# Patient Record
Sex: MALE | Race: WHITE | Employment: OTHER | URBAN - METROPOLITAN AREA
[De-identification: names, ages, dates, MRNs, and addresses within clinical notes are randomized per-mention and may not be internally consistent; named-entity substitution may affect disease eponyms.]

---

## 2019-01-15 ENCOUNTER — HOSPITAL ENCOUNTER (OUTPATIENT)
Dept: WOUND CARE | Age: 84
Discharge: HOME | End: 2019-01-15
Payer: MEDICARE

## 2019-01-15 VITALS
RESPIRATION RATE: 18 BRPM | SYSTOLIC BLOOD PRESSURE: 166 MMHG | WEIGHT: 200 LBS | BODY MASS INDEX: 32.14 KG/M2 | HEART RATE: 81 BPM | DIASTOLIC BLOOD PRESSURE: 88 MMHG | HEIGHT: 66 IN | OXYGEN SATURATION: 97 % | TEMPERATURE: 98 F

## 2019-01-15 PROBLEM — M70.22 OLECRANON BURSITIS, LEFT ELBOW: Status: ACTIVE | Noted: 2019-01-15

## 2019-01-15 PROCEDURE — 99214 OFFICE O/P EST MOD 30 MIN: CPT

## 2019-01-15 NOTE — WOUND CARE
01/15/19 1420 Wound Elbow Left Date First Assessed/Time First Assessed: 01/15/19 1418   POA: Yes  Wound Type: Abcess; Other  Location: Elbow  Wound Description (Optional): open with drainage  Orientation: Left DRESSING STATUS Intact; Old drainage; Other (comment) (bandaide) DRESSING TYPE (bandaide) Pressure Injury Stage 3 Wound Length (cm) 0.2 cm Wound Width (cm) 0.3 cm Wound Depth (cm) 1 Wound Surface area (cm^2) 0.06 cm^2 Condition of Find That File Condition of Edges Rolled/curled Tissue Type Yellow Tissue Type Percent Pink (white) Tissue Type Percent Yellow 90 Direction of Tunnels 12  oclock Depth of Tunnel/Sinus (cm) 3.4 cm Drainage Color Clear Periwound Skin Condition Erythema, blanchable Cleansing and Cleansing Agents  Dermal wound cleanser Dressing Type Applied Collagens/cell matrix (mepitel one, mepilex heel border, coban ) Procedure Tolerated Well

## 2019-01-15 NOTE — WOUND CARE
[facility] Consult [facility] Chief Complaint: 
Kevin Emery is a 80 y.o.  male who presents with left elbow open wound and drainage secondary to olecranon bursitis since December 2018. Referred by:  Arturo Rogers HPI:   He started with swelling and later it was inflamed, he was seen by his PCP and diagnosed with olecranon bursitis, he had cultures done and started on augmentin. Wound caused by: likely pressure Current wound care: local wound care Offloading wound: no Appetite: good Wound associated pain: mild Diabetic: no 
Smoker: no 
 
 
Past Medical History:  
Diagnosis Date  CAD (coronary artery disease)  Hypertension  Macular degeneration Past Surgical History:  
Procedure Laterality Date [address] [facility] CABG  
 HX HEENT  2007  
 surgical lens implant History reviewed. No pertinent family history. Social History Tobacco Use  Smoking status: Never Smoker Substance Use Topics  Alcohol use: No  
   
Prior to Admission medications Medication Sig Start Date End Date Taking? Authorizing Provider  
clopidogrel (PLAVIX) 75 mg tablet Take  by mouth daily. Yes Amy, MD Felipe  
aspirin 81 mg chewable tablet Take 81 mg by mouth daily. Yes Other, MD Felipe  
lisinopril (PRINIVIL, ZESTRIL) 20 mg tablet Take  by mouth daily. Yes Amy, MD Felipe  
simvastatin (ZOCOR) 20 mg tablet Take  by mouth nightly. Yes Felipe Reyes MD  
furosemide (LASIX) 20 mg tablet Take 20 mg by mouth two (2) days a week. Indications: sundays and wednesdays   Yes Amy, MD Felipe  
nitroglycerin (NITROSTAT) 0.4 mg SL tablet 0.4 mg by SubLINGual route every five (5) minutes as needed for Chest Pain. Yes Amy, MD Felipe  
ergocalciferol (VITAMIN D2) 50,000 unit capsule Take 50,000 Units by mouth daily. Yes Amy, MD Felipe  
fluticasone (FLONASE) 50 mcg/actuation nasal spray 2 Sprays by Both Nostrils route daily.    Yes Felipe Reyes MD  
 loratadine (CLARITIN) 10 mg tablet Take 10 mg by mouth daily. Yes Other, MD Felipe  
ranitidine (ZANTAC) 150 mg tablet Take 150 mg by mouth two (2) times a day. Yes Other, MD Felipe  
multivitamin, tx-iron-ca-min (THERA-M W/ IRON) 9 mg iron-400 mcg tab tablet Take 1 Tab by mouth daily. Yes Other, MD Felipe  
carboxymethylcellulose sodium (REFRESH LIQUIGEL) 1 % dlgl Apply  to eye. Yes Other, MD Felipe  
 
Allergies Allergen Reactions  Iodinated Contrast- Oral And Iv Dye Anaphylaxis  Iodine Anaphylaxis  Cleocin [Clindamycin Hcl] Rash  Sulfa (Sulfonamide Antibiotics) Rash Review of Systems Gen: No fever, chills, malaise, weight loss/gain. Heent: No headache, rhinorrhea, epistaxis, ear pain, hearing loss, sinus pain, neck pain/stiffness, sore throat. Heart: No chest pain, palpitations, PATRICIO, pnd, or orthopnea. Resp: No cough, hemoptysis, wheezing and shortness of breath. GI: No nausea, vomiting, diarrhea, constipation, melena or hematochezia. : No urinary obstruction, dysuria or hematuria. Derm: see above Musc/skeletal: no bone or joint complains. Vasc: No edema, cyanosis or claudication. Endo: No heat/cold intolerance, no polyuria,polydipsia or polyphagia. Neuro: No unilateral weakness, numbness, tingling. No seizures. Heme: No easy bruising or bleeding. Objective:  
 
Physical Exam:  
 
Visit Vitals /88 (BP 1 Location: Left arm, BP Patient Position: Sitting) Pulse 81 Temp 98 °F (36.7 °C) Resp 18 Ht 5' 6\" (1.676 m) Wt 90.7 kg (200 lb) SpO2 97% BMI 32.28 kg/m² General: well developed, well nourished, pleasant , NAD. Hygiene good Psych: cooperative. Pleasant. No anxiety or depression. Normal mood and affect. Neuro: alert and oriented to person/place/situation. Otherwise nonfocal. 
Derm: Skin turgor for age, dry skin HEENT: Normocephalic, atraumatic. EOMI. Conjunctiva clear. No scleral icterus. Neck: Normal range of motion. No masses. Chest: Good air entry bilaterally. Respirations nonlabored Cardio[de-identified] Normal heart sounds,no rubs, murmurs or gallops Abdomen: Soft, nontender, nondistended, normoactive bowel sounds Lower extremities: color normal; temperature normal. Calves are supple, nontender. Capillary refill <3 sec Open wound left elbow Wound Description: Wound Length: 0.2 cm Wound Width :0.3 cm Wound Depth :1 
 
Etiology: pressure Ulcer bed: wound bed not visible Periwound: Edematous Exudate: Scant/small amount Serous exudate Data Review: No results found for this or any previous visit (from the past 24 hour(s)). Assessment:  
 
Patient Active Problem List  
Diagnosis Code  Olecranon bursitis, left elbow M70.22  
 
80 y.o. male with olecranon bursitis Needs : 
 
Good local wound care Keep pressure off Plan: In wound care clinic today: 
Cleanse wound with NS or soap and water or commercial wound cleanser Apply the following topically to wound bed: evelin Apply the following to prince-wound: NA Apply the following dressings: Absorptive dressing For Home Care/Self Care: keep pressure off left elbow Cleanse wound with NS or soap and water or commercial wound cleanser Keep dressing dry and intact when bathing Apply the following to wound bed: evelin Apply the following to skin around wound: NA Apply the following dressings: Absorptive dressing Patient to return for wound care in: 14  Days Follow up with Nurse visit as recommended. PLEASE CONTACT OFFICE AS SOON AS POSSIBLE IF UNABLE TO MAKE THIS APPOINTMENT. Inspect your wounds, looking for signs of infection which may include the following:  Increase in redness  Red \"streaks\" from wound  Increase in swelling  Fever  Unusual odor  Change in the amount of wound drainage.  Should you experience any significant changes in your wound(s) or have any questions regarding your home care instructions please contact the wound center or your home health company. If after regular business hours, please call your family doctor or local emergency room. Signed By: Alejandro Koenig MD   
 January 15, 2019

## 2019-01-21 ENCOUNTER — HOSPITAL ENCOUNTER (OUTPATIENT)
Dept: WOUND CARE | Age: 84
Discharge: HOME | End: 2019-01-21
Payer: MEDICARE

## 2019-01-21 VITALS
SYSTOLIC BLOOD PRESSURE: 120 MMHG | DIASTOLIC BLOOD PRESSURE: 53 MMHG | TEMPERATURE: 97.5 F | RESPIRATION RATE: 17 BRPM | OXYGEN SATURATION: 95 % | HEART RATE: 84 BPM

## 2019-01-21 PROCEDURE — 99211 OFF/OP EST MAY X REQ PHY/QHP: CPT

## 2019-01-21 NOTE — WOUND CARE
01/21/19 1046 Wound Elbow Left Date First Assessed/Time First Assessed: 01/15/19 1418   POA: Yes  Wound Type: Abcess; Other  Location: Elbow  Wound Description (Optional): open with drainage  Orientation: Left DRESSING STATUS Clean, dry, and intact DRESSING TYPE Absorptive Non-Pressure Injury Full thickness (subcut/muscle) Wound Length (cm) 0.4 cm Wound Width (cm) 0.5 cm Wound Depth (cm) 0.2 Wound Surface area (cm^2) 0.2 cm^2 Change in Wound Size % -233.33 Condition of Base Pink Condition of Edges Open Tissue Type Pink;Red Direction of Tunnels 11  oclock;1    oclock Depth of Tunnel/Sinus (cm) 3.1 cm Drainage Amount  Moderate Drainage Color Serosanguinous Wound Odor None Periwound Skin Condition Intact Cleansing and Cleansing Agents  Other (comment); Soap and water (vashe) Dressing Type Applied Other (Comment) (Patti packing, bacitracin, mepilex heel, soft roll, ACE) Procedure Tolerated Well

## 2019-01-21 NOTE — DISCHARGE INSTRUCTIONS
For Home Care/Self Care  Keep dressing dry and intact when bathing    Leave dressings in place until next visit    Patient to return for wound care in: [place] [address] IF UNABLE TO 24 [facility]. Inspect your wounds, looking for signs of infection which may include the following:  Increase in redness  Red \"streaks\" from wound  Increase in swelling  Fever  Unusual odor  Change in the amount of wound drainage. Should you experience any significant changes in your wound(s) or have any questions regarding your home care instructions please contact the wound center or your home health company. If after regular business hours, please call your family doctor or local emergency room. Edema Control:   Elevate legs as much as possible. Avoid standing in one position for more than 10 minutes. Avoid setting with legs down. Do not cross legs while sitting. Off-Loading:     Frequent position changes. Do not cross legs while sitting. Shift weight every 20 minutes or more when sitting for prolonged periods of time.

## 2019-01-24 ENCOUNTER — HOSPITAL ENCOUNTER (OUTPATIENT)
Dept: WOUND CARE | Age: 84
Discharge: HOME | End: 2019-01-24
Payer: MEDICARE

## 2019-01-24 VITALS
DIASTOLIC BLOOD PRESSURE: 59 MMHG | RESPIRATION RATE: 16 BRPM | SYSTOLIC BLOOD PRESSURE: 149 MMHG | HEART RATE: 74 BPM | OXYGEN SATURATION: 99 % | TEMPERATURE: 98.3 F

## 2019-01-24 PROCEDURE — 99211 OFF/OP EST MAY X REQ PHY/QHP: CPT

## 2019-01-24 NOTE — WOUND CARE
01/24/19 1107 Wound Elbow Left Date First Assessed/Time First Assessed: 01/15/19 1418   POA: Yes  Wound Type: Abcess; Other  Location: Elbow  Wound Description (Optional): open with drainage  Orientation: Left DRESSING STATUS Clean, dry, and intact DRESSING TYPE Absorptive Non-Pressure Injury Full thickness (subcut/muscle) Wound Length (cm) 0.3 cm Wound Width (cm) 0.3 cm Wound Depth (cm) 0.2 Wound Surface area (cm^2) 0.09 cm^2 Change in Wound Size % -50 Condition of Base Pink Condition of Edges Open Tissue Type Pink;Red Direction of Tunnels 11  oclock;12  oclock Depth of Tunnel/Sinus (cm) 4.5 cm Drainage Amount  Moderate Drainage Color Serosanguinous; Tan  
Wound Odor None Periwound Skin Condition Intact Cleansing and Cleansing Agents  (vashe) Dressing Type Applied (evelin packing,calcium alginate,mepilex heel border,softroll) Procedure Tolerated Well

## 2019-01-28 ENCOUNTER — HOSPITAL ENCOUNTER (OUTPATIENT)
Dept: WOUND CARE | Age: 84
Discharge: HOME | End: 2019-01-28
Payer: MEDICARE

## 2019-01-28 VITALS
DIASTOLIC BLOOD PRESSURE: 59 MMHG | SYSTOLIC BLOOD PRESSURE: 149 MMHG | HEART RATE: 74 BPM | OXYGEN SATURATION: 99 % | TEMPERATURE: 98.3 F

## 2019-01-28 PROCEDURE — 99211 OFF/OP EST MAY X REQ PHY/QHP: CPT

## 2019-01-28 NOTE — WOUND CARE
01/28/19 1403 Wound Elbow Left Date First Assessed/Time First Assessed: 01/15/19 1418   POA: Yes  Wound Type: Abcess; Other  Location: Elbow  Wound Description (Optional): open with drainage  Orientation: Left DRESSING STATUS Clean, dry, and intact Non-Pressure Injury Full thickness (subcut/muscle) Wound Length (cm) 0.3 cm Wound Width (cm) 0.3 cm Wound Depth (cm) 0 Wound Surface area (cm^2) 0.09 cm^2 Change in Wound Size % -50 Condition of Base Pink Condition of Edges Open Tissue Type Pink;Red Direction of Tunnels 11  oclock;12  oclock Depth of Tunnel/Sinus (cm) 3.5 cm Drainage Amount  Moderate Drainage Color Serosanguinous; Tan  
Wound Odor None Periwound Skin Condition Intact Cleansing and Cleansing Agents  Other (comment) (Vashe wound cleanser) Dressing Type Applied Collagens/cell matrix 
(soft roll, coban, mepitel one, mepilex, border) Procedure Tolerated Well

## 2019-01-31 ENCOUNTER — HOSPITAL ENCOUNTER (OUTPATIENT)
Dept: WOUND CARE | Age: 84
Discharge: HOME | End: 2019-01-31
Payer: MEDICARE

## 2019-01-31 VITALS
TEMPERATURE: 98.6 F | SYSTOLIC BLOOD PRESSURE: 117 MMHG | OXYGEN SATURATION: 99 % | RESPIRATION RATE: 18 BRPM | DIASTOLIC BLOOD PRESSURE: 50 MMHG | HEART RATE: 75 BPM

## 2019-01-31 PROCEDURE — 99211 OFF/OP EST MAY X REQ PHY/QHP: CPT

## 2019-01-31 NOTE — DISCHARGE INSTRUCTIONS
er   Keep dressing dry and intact when bathing   Apply the following to wound bed: evelin   Apply the following to skin around wound: NA   Apply the following dressings: Absorptive dressing     Leave dressings in place until next visit     Patient to return for wound care in:   Days   Follow up with Nurse visit as recommended. PLEASE CONTACT OFFICE AS SOON AS POSSIBLE IF UNABLE TO MAKE THIS APPOINTMENT. Inspect your wounds, looking for signs of infection which may include the following:  Increase in redness  Red \"streaks\" from wound  Increase in swelling  Fever  Unusual odor  Change in the amount of wound drainage. Should you experience any significant changes in your wound(s) or have any questions regarding your home care instructions please contact the wound center or your home health company. If after regular business hours, please call your family doctor or local emergency room.     01/28/19 [id number]

## 2019-02-04 ENCOUNTER — APPOINTMENT (OUTPATIENT)
Dept: WOUND CARE | Age: 84
End: 2019-02-04

## 2019-02-04 ENCOUNTER — HOSPITAL ENCOUNTER (OUTPATIENT)
Dept: WOUND CARE | Age: 84
Discharge: HOME | End: 2019-02-04
Attending: HOSPITALIST
Payer: MEDICARE

## 2019-02-04 VITALS
TEMPERATURE: 97.8 F | DIASTOLIC BLOOD PRESSURE: 58 MMHG | SYSTOLIC BLOOD PRESSURE: 139 MMHG | OXYGEN SATURATION: 96 % | HEART RATE: 72 BPM | RESPIRATION RATE: 20 BRPM

## 2019-02-04 PROCEDURE — 99211 OFF/OP EST MAY X REQ PHY/QHP: CPT

## 2019-02-04 NOTE — DISCHARGE INSTRUCTIONS
For Home Care/Self Care  Keep dressing dry and intact when bathing    Leave dressings in place until next visit    Patient to return for wound care in: [place] [address] IF UNABLE TO 24 [facility]. Inspect your wounds, looking for signs of infection which may include the following:  Increase in redness  Red \"streaks\" from wound  Increase in swelling  Fever  Unusual odor  Change in the amount of wound drainage. Should you experience any significant changes in your wound(s) or have any questions regarding your home care instructions please contact the wound center or your home health company. If after regular business hours, please call your family doctor or local emergency room. Edema Control:   Elevate legs as much as possible. Avoid standing in one position for more than 10 minutes. Avoid setting with legs down. Do not cross legs while sitting. Off-Loading:     Frequent position changes. Do not cross legs while sitting. Shift weight every 20 minutes or more when sitting for prolonged periods of time.

## 2019-02-04 NOTE — WOUND CARE
02/04/19 1455 Wound Elbow Left Date First Assessed/Time First Assessed: 01/15/19 1418   POA: Yes  Wound Type: Abcess; Other  Location: Elbow  Wound Description (Optional): open with drainage  Orientation: Left Dressing Status  Clean, dry, and intact Dressing Type  Absorptive Non-Pressure Injury Full thickness (subcut/muscle) Wound Length (cm) 0.2 cm Wound Width (cm) 0.2 cm Wound Depth (cm) 0.3 Wound Surface area (cm^2) 0.04 cm^2 Change in Wound Size % 33.33 Condition of Base Pink Condition of Edges Open Tissue Type Pink;Red Direction of Undermining 7    oclock;4    oclock Depth of Undermining (cm) (5 cm at 12 o'clock) Drainage Amount  Moderate Drainage Color Serosanguinous Wound Odor None Periwound Skin Condition Intact Cleansing and Cleansing Agents  Other (comment); Soap and water (vashe) Dressing Type Applied Other (Comment) (Patti, bacitracin, Mepilex border, soft roll, coban) Procedure Tolerated Well

## 2019-02-05 NOTE — WOUND CARE
01/31/19 1058 Wound Elbow Left Date First Assessed/Time First Assessed: 01/15/19 1418   POA: Yes  Wound Type: Abcess; Other  Location: Elbow  Wound Description (Optional): open with drainage  Orientation: Left Dressing Status  Intact; Old drainage Dressing Type  Absorptive Non-Pressure Injury Full thickness (subcut/muscle) Wound Length (cm) 0.3 cm Wound Width (cm) 0.3 cm Wound Depth (cm) 0.1 Wound Surface area (cm^2) 0.09 cm^2 Change in Wound Size % -50 Condition of Base Pink Condition of Edges Open Tissue Type Pink;Red Direction of Tunnels 11  oclock;12  oclock Depth of Tunnel/Sinus (cm) (3.5) Drainage Amount  Moderate Drainage Color Serosanguinous; Tan  
Wound Odor None Periwound Skin Condition Intact Cleansing and Cleansing Agents  Dermal wound cleanser; Other (comment) Dressing Type Applied Collagens/cell matrix (Aquacel Ag, bacitracin, mepilex foam, soft roll, coban) Procedure Tolerated Well

## 2019-02-07 ENCOUNTER — HOSPITAL ENCOUNTER (OUTPATIENT)
Dept: WOUND CARE | Age: 84
Discharge: HOME | End: 2019-02-07
Attending: HOSPITALIST
Payer: MEDICARE

## 2019-02-07 VITALS
OXYGEN SATURATION: 100 % | TEMPERATURE: 97.9 F | SYSTOLIC BLOOD PRESSURE: 155 MMHG | HEART RATE: 67 BPM | RESPIRATION RATE: 18 BRPM | DIASTOLIC BLOOD PRESSURE: 54 MMHG

## 2019-02-07 PROCEDURE — 99211 OFF/OP EST MAY X REQ PHY/QHP: CPT

## 2019-02-07 NOTE — WOUND CARE
02/07/19 1027 Wound Elbow Left Date First Assessed/Time First Assessed: 01/15/19 1418   POA: Yes  Wound Type: Abcess; Other  Location: Elbow  Wound Description (Optional): open with drainage  Orientation: Left Dressing Status  Clean, dry, and intact Dressing Type  Absorptive Non-Pressure Injury Full thickness (subcut/muscle) Wound Length (cm) 0.2 cm Wound Width (cm) 0.2 cm Wound Depth (cm) 0.3 Wound Surface area (cm^2) 0.04 cm^2 Change in Wound Size % 33.33 Condition of Base Pink Condition of Edges Open Tissue Type Pink;Red Direction of Tunnels 11  oclock;12  oclock Drainage Amount  Moderate Drainage Color Serosanguinous Wound Odor None Periwound Skin Condition Intact Cleansing and Cleansing Agents  Other (comment) Dressing Type Applied Other (Comment) 
(evelin,bacitracin,mepitel one border heel,softroll,coban.) Procedure Tolerated Well

## 2019-02-11 ENCOUNTER — HOSPITAL ENCOUNTER (OUTPATIENT)
Dept: WOUND CARE | Age: 84
Discharge: HOME | End: 2019-02-11
Attending: HOSPITALIST
Payer: MEDICARE

## 2019-02-11 VITALS
OXYGEN SATURATION: 98 % | HEART RATE: 67 BPM | DIASTOLIC BLOOD PRESSURE: 56 MMHG | TEMPERATURE: 98.2 F | RESPIRATION RATE: 17 BRPM | SYSTOLIC BLOOD PRESSURE: 146 MMHG

## 2019-02-11 PROCEDURE — 99211 OFF/OP EST MAY X REQ PHY/QHP: CPT

## 2019-02-11 NOTE — DISCHARGE INSTRUCTIONS
In wound care clinic today:[id number] wound with NS or soap and water or commercial wound cleanser  Apply the following topically to wound bed: evelin  Apply the following to prince-wound: NA  Apply the following dressings: Absorptive dressing    For Home Care/Self Care: keep pressure off left elbow  Keep dressing dry and intact when bathing    Leave dressings in place until next visit    Patient to return for wound care in: 2/14/19 for nurse evaluation      PLEASE CONTACT OFFICE AS SOON AS POSSIBLE IF UNABLE TO [address] [facility] [address]. Inspect your wounds, looking for signs of infection which may include the following:  Increase in redness  Red \"streaks\" from wound  Increase in swelling  Fever  Unusual odor  Change in the amount of wound drainage. Should you experience any significant changes in your wound(s) or have any questions regarding your home care instructions please contact the wound center or your home health company.  If after regular business hours, please call your family doctor or local emergency room

## 2019-02-11 NOTE — WOUND CARE
02/11/19 1010 Wound Elbow Left Date First Assessed/Time First Assessed: 01/15/19 1418   POA: Yes  Wound Type: Abcess; Other  Location: Elbow  Wound Description (Optional): open with drainage  Orientation: Left Dressing Status  Clean, dry, and intact Dressing Type  Absorptive Non-Pressure Injury Full thickness (subcut/muscle) Wound Length (cm) 0.2 cm Wound Width (cm) 0.2 cm Wound Depth (cm) 0.3 Wound Surface area (cm^2) 0.04 cm^2 Change in Wound Size % 33.33 Condition of Base Pink Condition of Edges Rolled/curled Tissue Type Pink Depth of Undermining (cm) (did not investigate risk damaging connective membranes) Drainage Amount  Moderate Drainage Color Serous Wound Odor None Periwound Skin Condition Ecchymosis; Erythema, blanchable Cleansing and Cleansing Agents  Dermal wound cleanser Dressing Type Applied Collagens/cell matrix; Silicone Procedure Tolerated Well

## 2019-02-14 ENCOUNTER — HOSPITAL ENCOUNTER (OUTPATIENT)
Dept: WOUND CARE | Age: 84
Discharge: HOME | End: 2019-02-14
Attending: HOSPITALIST
Payer: MEDICARE

## 2019-02-14 VITALS
SYSTOLIC BLOOD PRESSURE: 171 MMHG | HEART RATE: 63 BPM | OXYGEN SATURATION: 100 % | DIASTOLIC BLOOD PRESSURE: 56 MMHG | TEMPERATURE: 98.6 F

## 2019-02-14 PROCEDURE — 99211 OFF/OP EST MAY X REQ PHY/QHP: CPT

## 2019-02-14 NOTE — WOUND CARE
02/14/19 1152 Wound Elbow Left Date First Assessed/Time First Assessed: 01/15/19 1418   POA: Yes  Wound Type: Abcess; Other  Location: Elbow  Wound Description (Optional): open with drainage  Orientation: Left Dressing Status  Clean, dry, and intact Dressing Type  Absorptive Non-Pressure Injury Full thickness (subcut/muscle) Wound Length (cm) 0.2 cm Wound Width (cm) 0.2 cm Wound Depth (cm) 3.5 Wound Surface area (cm^2) 0.04 cm^2 Change in Wound Size % 33.33 Condition of Base Pink Condition of Edges Rolled/curled Tissue Type Pink Direction of Tunnels 11  oclock;12  oclock Drainage Amount  Scant Drainage Color Clear Wound Odor None Periwound Skin Condition Erythema, blanchable Cleansing and Cleansing Agents  Dermal wound cleanser Dressing Type Applied Collagens/cell matrix Procedure Tolerated Well

## 2019-02-18 ENCOUNTER — HOSPITAL ENCOUNTER (OUTPATIENT)
Dept: WOUND CARE | Age: 84
Discharge: HOME | End: 2019-02-18
Attending: HOSPITALIST
Payer: MEDICARE

## 2019-02-18 VITALS
OXYGEN SATURATION: 99 % | RESPIRATION RATE: 18 BRPM | TEMPERATURE: 98.3 F | DIASTOLIC BLOOD PRESSURE: 65 MMHG | SYSTOLIC BLOOD PRESSURE: 141 MMHG

## 2019-02-18 PROCEDURE — 99211 OFF/OP EST MAY X REQ PHY/QHP: CPT

## 2019-02-18 NOTE — WOUND CARE
02/18/19 1024 Wound Elbow Left Date First Assessed/Time First Assessed: 01/15/19 1418   POA: Yes  Wound Type: Abcess; Other  Location: Elbow  Wound Description (Optional): open with drainage  Orientation: Left Dressing Status  Clean, dry, and intact Dressing Type  Absorptive Non-Pressure Injury Full thickness (subcut/muscle) Wound Length (cm) 0.2 cm Wound Width (cm) 0.2 cm Wound Depth (cm) 3.5 Wound Surface area (cm^2) 0.04 cm^2 Change in Wound Size % 33.33 Condition of Base Pink Condition of Edges Rolled/curled; Open Tissue Type Pink Direction of Tunnels 11  oclock;12  oclock Drainage Amount  Scant Drainage Color Serous Wound Odor None Periwound Skin Condition Erythema, blanchable Cleansing and Cleansing Agents  Dermal wound cleanser Dressing Type Applied (evelin,mepilex border,softroll,coban)

## 2019-02-21 ENCOUNTER — HOSPITAL ENCOUNTER (OUTPATIENT)
Dept: WOUND CARE | Age: 84
Discharge: HOME | End: 2019-02-21
Attending: HOSPITALIST
Payer: MEDICARE

## 2019-02-21 VITALS
OXYGEN SATURATION: 96 % | RESPIRATION RATE: 18 BRPM | HEART RATE: 70 BPM | DIASTOLIC BLOOD PRESSURE: 56 MMHG | SYSTOLIC BLOOD PRESSURE: 115 MMHG | TEMPERATURE: 97.6 F

## 2019-02-21 PROCEDURE — 99211 OFF/OP EST MAY X REQ PHY/QHP: CPT

## 2019-02-21 NOTE — WOUND CARE
02/21/19 1040 Wound Elbow Left Date First Assessed/Time First Assessed: 01/15/19 1418   POA: Yes  Wound Type: Abcess; Other  Location: Elbow  Wound Description (Optional): open with drainage  Orientation: Left Dressing Status  Reinforced Dressing Type  Absorptive Non-Pressure Injury Full thickness (subcut/muscle) Wound Length (cm) 0.2 cm Wound Width (cm) 0.2 cm Wound Depth (cm) 3.2 Wound Surface area (cm^2) 0.04 cm^2 Change in Wound Size % 33.33 Condition of Base Pink Condition of Edges Open Tissue Type Pink Direction of Undermining 11    oclock;12    oclock Depth of Undermining (cm) 3.2 Drainage Amount  Moderate Drainage Color Serosanguinous; Tan  
Wound Odor None Periwound Skin Condition Intact Cleansing and Cleansing Agents  Other (comment) (vashe) Dressing Type Applied Other (Comment) (Patti, Mepilex border, soft roll, coban) Procedure Tolerated Well

## 2019-02-21 NOTE — DISCHARGE INSTRUCTIONS
For Home Care/Self Care  Keep dressing dry and intact when bathing    Leave dressings in place until next visit    [facility]. Inspect your wounds, looking for signs of infection which may include the following:  Increase in redness  Red \"streaks\" from wound  Increase in swelling  Fever  Unusual odor  Change in the amount of wound drainage. Should you experience any significant changes in your wound(s) or have any questions regarding your home care instructions please contact the wound center or your home health company. If after regular business hours, please call your family doctor or local emergency room. Edema Control:   Elevate legs as much as possible. Avoid standing in one position for more than 10 minutes. Avoid setting with legs down. Do not cross legs while sitting. Off-Loading:     Frequent position changes. Do not cross legs while sitting. Shift weight every 20 minutes or more when sitting for prolonged periods of time.

## 2019-02-25 ENCOUNTER — HOSPITAL ENCOUNTER (OUTPATIENT)
Dept: WOUND CARE | Age: 84
Discharge: HOME | End: 2019-02-25
Attending: HOSPITALIST
Payer: MEDICARE

## 2019-02-25 VITALS
SYSTOLIC BLOOD PRESSURE: 161 MMHG | RESPIRATION RATE: 18 BRPM | DIASTOLIC BLOOD PRESSURE: 62 MMHG | OXYGEN SATURATION: 100 % | TEMPERATURE: 97.6 F | HEART RATE: 72 BPM

## 2019-02-25 PROCEDURE — 99211 OFF/OP EST MAY X REQ PHY/QHP: CPT

## 2019-02-25 NOTE — DISCHARGE INSTRUCTIONS
For Home Care/Self Care  Keep dressing dry and intact when bathing    Leave dressings in place until next visit    [facility] IF [facility]NA[facility] TO [facility]. Inspect your wounds, looking for signs of infection which may include the following:  Increase in redness  Red \"streaks\" from wound  Increase in swelling  Fever  Unusual odor  Change in the amount of wound drainage. Should you experience any significant changes in your wound(s) or have any questions regarding your home care instructions please contact the wound center or your home health company. If after regular business hours, please call your family doctor or local emergency room. Edema Control:   Elevate legs as much as possible. Avoid standing in one position for more than 10 minutes. Avoid setting with legs down. Do not cross legs while sitting. Off-Loading:     Frequent position changes. Do not cross legs while sitting. Shift weight every 20 minutes or more when sitting for prolonged periods of time.

## 2019-02-25 NOTE — WOUND CARE
02/25/19 1006 Wound Elbow Left Date First Assessed/Time First Assessed: 01/15/19 1418   POA: Yes  Wound Type: Abcess; Other  Location: Elbow  Wound Description (Optional): open with drainage  Orientation: Left Dressing Status  Breakthrough drainage Dressing Type  Other (Comment) (coban, soft roll, mepilex border, silvercell ) Non-Pressure Injury Full thickness (subcut/muscle) Wound Length (cm) 0.2 cm Wound Width (cm) 0.2 cm Wound Depth (cm) 0.4 Wound Surface area (cm^2) 0.04 cm^2 Change in Wound Size % 33.33 Condition of Base Pink Condition of Edges Rolled/curled Tissue Type Pink Direction of Tunnels 12  oclock Depth of Tunnel/Sinus (cm) 3.4 cm Direction of Undermining 1    oclock;11    oclock Depth of Undermining (cm) 0.8 Drainage Amount  Small Drainage Color Serosanguinous Wound Odor None Periwound Skin Condition Intact Cleansing and Cleansing Agents  Other (comment) (vashe) Dressing Type Applied Other (Comment) (coban, soft roll, mepilex border, silvercell, evelin) Procedure Tolerated Well

## 2019-02-28 ENCOUNTER — HOSPITAL ENCOUNTER (OUTPATIENT)
Dept: WOUND CARE | Age: 84
Discharge: HOME | End: 2019-02-28
Attending: HOSPITALIST
Payer: MEDICARE

## 2019-02-28 VITALS
SYSTOLIC BLOOD PRESSURE: 137 MMHG | TEMPERATURE: 98 F | OXYGEN SATURATION: 100 % | DIASTOLIC BLOOD PRESSURE: 54 MMHG | RESPIRATION RATE: 18 BRPM | HEART RATE: 66 BPM

## 2019-02-28 PROCEDURE — 99211 OFF/OP EST MAY X REQ PHY/QHP: CPT

## 2019-02-28 NOTE — DISCHARGE INSTRUCTIONS
For Home Care/Self Care  Keep dressing dry and intact when bathing    Leave dressings in place until next visit    [facility]. Inspect your wounds, looking for signs of infection which may include the following:  Increase in redness  Red \"streaks\" from wound  Increase in swelling  Fever  Unusual odor  Change in the amount of wound drainage. Should you experience any significant changes in your wound(s) or have any questions regarding your home care instructions please contact the wound center or your home health company. If after regular business hours, please call your family doctor or local emergency room. Edema Control:   Elevate legs as much as possible. Avoid standing in one position for more than 10 minutes. Avoid setting with legs down. Do not cross legs while sitting. Off-Loading:     Frequent position changes. Do not cross legs while sitting. Shift weight every 20 minutes or more when sitting for prolonged periods of time.

## 2019-02-28 NOTE — WOUND CARE
02/28/19 1058 Wound Elbow Left Date First Assessed/Time First Assessed: 01/15/19 1418   POA: Yes  Wound Type: Abcess; Other  Location: Elbow  Wound Description (Optional): open with drainage  Orientation: Left Dressing Status  Removed; Other (comment) (Patient stated facility changed outer dressing) Dressing Type  Absorptive; Compression dressing Non-Pressure Injury Full thickness (subcut/muscle) Wound Length (cm) 0.2 cm Wound Width (cm) 0.2 cm Wound Depth (cm) 0.2 Wound Surface area (cm^2) 0.04 cm^2 Change in Wound Size % 33.33 Condition of Base Pink Condition of Edges Rolled/curled Tissue Type Pink Direction of Tunnels 12  oclock Depth of Tunnel/Sinus (cm) 2.5 cm Direction of Undermining 1    oclock;11    oclock Depth of Undermining (cm) 0.8 Drainage Amount  Small Drainage Color Serosanguinous Wound Odor None Periwound Skin Condition Intact; Erythema, blanchable;Ecchymosis Cleansing and Cleansing Agents  Other (comment); Soap and water (vashe) Dressing Type Applied Other (Comment) (Patti, Silvercel, Mepilex border, soft roll, coban) Procedure Tolerated Well

## 2019-03-04 ENCOUNTER — HOSPITAL ENCOUNTER (OUTPATIENT)
Dept: WOUND CARE | Age: 84
Discharge: HOME | End: 2019-03-04
Payer: MEDICARE

## 2019-03-04 VITALS — DIASTOLIC BLOOD PRESSURE: 48 MMHG | HEART RATE: 67 BPM | TEMPERATURE: 97.7 F | SYSTOLIC BLOOD PRESSURE: 122 MMHG

## 2019-03-04 PROCEDURE — 99211 OFF/OP EST MAY X REQ PHY/QHP: CPT

## 2019-03-04 NOTE — WOUND CARE
03/04/19 1548 Wound Elbow Left Date First Assessed/Time First Assessed: 01/15/19 1418   POA: Yes  Wound Type: Abcess; Other  Location: Elbow  Wound Description (Optional): open with drainage  Orientation: Left Dressing Status  Clean, dry, and intact Dressing Type  Absorptive Non-Pressure Injury Full thickness (subcut/muscle) Wound Length (cm) 0.2 cm Wound Width (cm) 0.2 cm Wound Depth (cm) 0.2 Wound Surface area (cm^2) 0.04 cm^2 Change in Wound Size % 33.33 Condition of Base Pink Condition of Edges Rolled/curled Tissue Type Pink Direction of Tunnels 12  oclock Depth of Tunnel/Sinus (cm) 2.4 cm Direction of Undermining 1    oclock;11    oclock Depth of Undermining (cm) 0.8 Drainage Amount  Small Drainage Color Serosanguinous Wound Odor None Periwound Skin Condition Erythema, blanchable; Intact Cleansing and Cleansing Agents  Dermal wound cleanser Dressing Type Applied Collagens/cell matrix; Other (Comment) (mepilex border, soft roll, coban, silver product) Procedure Tolerated Well

## 2019-03-04 NOTE — DISCHARGE INSTRUCTIONS
For Home Care/Self C  Keep dressing dry and intact when bathing     Leave dressings in place until next visit     Patient to return for wound care in: [address] [facility] IF UNABLE TO MAKE THIS APPOINTMENT. Inspect your wounds, looking for signs of infection which may include the following:  Increase in redness  Red \"streaks\" from wound  Increase in swelling  Fever  Unusual odor  Change in the amount of wound drainage. Should you experience any significant changes in your wound(s) or have any questions regarding your home care instructions please contact the wound center or your home health company. If after regular business hours, please call your family doctor or local emergency room. Edema Control:   Elevate legs as much as possible. Avoid standing in one position for more than 10 minutes. Avoid setting with legs down. Do not cross legs while sitting. Off-Loading:     Frequent position changes. Do not cross legs while sitting. Shift weight every 20 minutes or more when sitting for prolonged periods of time.     03- 1108

## 2019-03-07 ENCOUNTER — HOSPITAL ENCOUNTER (OUTPATIENT)
Dept: WOUND CARE | Age: 84
Discharge: HOME | End: 2019-03-07
Payer: MEDICARE

## 2019-03-07 VITALS
DIASTOLIC BLOOD PRESSURE: 56 MMHG | SYSTOLIC BLOOD PRESSURE: 124 MMHG | TEMPERATURE: 97.5 F | RESPIRATION RATE: 19 BRPM | OXYGEN SATURATION: 99 % | HEART RATE: 63 BPM

## 2019-03-07 PROCEDURE — 99211 OFF/OP EST MAY X REQ PHY/QHP: CPT

## 2019-03-07 NOTE — DISCHARGE INSTRUCTIONS
For Home Care/Self Care  Keep dressing dry and intact when bathing    Leave dressings in place until next visit    [facility]. Inspect your wounds, looking for signs of infection which may include the following:  Increase in redness  Red \"streaks\" from wound  Increase in swelling  Fever  Unusual odor  Change in the amount of wound drainage. Should you experience any significant changes in your wound(s) or have any questions regarding your home care instructions please contact the wound center or your home health company. If after regular business hours, please call your family doctor or local emergency room. Edema Control:   Elevate legs as much as possible. Avoid standing in one position for more than 10 minutes. Avoid setting with legs down. Do not cross legs while sitting. Off-Loading:     Frequent position changes. Do not cross legs while sitting. Shift weight every 20 minutes or more when sitting for prolonged periods of time.

## 2019-03-07 NOTE — WOUND CARE
03/07/19 1441   Wound Elbow Left   Date First Assessed/Time First Assessed: 01/15/19 1418   POA: Yes  Wound Type: Abcess; Other  Location: Elbow  Wound Description (Optional): open with drainage  Orientation: Left   Dressing Status  Clean, dry, and intact   Dressing Type  Absorptive   Non-Pressure Injury Full thickness (subcut/muscle)   Wound Length (cm) 0.2 cm   Wound Width (cm) 0.2 cm   Wound Depth (cm) 0.2   Wound Surface area (cm^2) 0.04 cm^2   Change in Wound Size % 33.33   Condition of Base Pink;Rolled/curled   Condition of Edges Open   Tissue Type Pink   Direction of Tunnels 12  oclock   Depth of Tunnel/Sinus (cm) 2.4 cm   Direction of Undermining 1    oclock;11    oclock   Depth of Undermining (cm) 0.8   Drainage Amount  Moderate   Drainage Color Serosanguinous; Tan   Wound Odor None   Periwound Skin Condition Intact; Other (comment)  (dry)   Cleansing and Cleansing Agents  Other (comment); Soap and water  (vashe)   Dressing Type Applied Other (Comment)  (Patti packing, silvercel, mepilex border, soft roll, coban)   Procedure Tolerated Well

## 2019-03-11 ENCOUNTER — HOSPITAL ENCOUNTER (OUTPATIENT)
Dept: WOUND CARE | Age: 84
Discharge: HOME | End: 2019-03-11
Payer: MEDICARE

## 2019-03-11 VITALS
RESPIRATION RATE: 18 BRPM | TEMPERATURE: 97.5 F | SYSTOLIC BLOOD PRESSURE: 150 MMHG | HEART RATE: 63 BPM | DIASTOLIC BLOOD PRESSURE: 58 MMHG | OXYGEN SATURATION: 99 %

## 2019-03-11 PROCEDURE — 99211 OFF/OP EST MAY X REQ PHY/QHP: CPT

## 2019-03-11 NOTE — WOUND CARE
03/11/19 1049   Wound Elbow Left   Date First Assessed/Time First Assessed: 01/15/19 1418   POA: Yes  Wound Type: Abcess; Other  Location: Elbow  Wound Description (Optional): open with drainage  Orientation: Left   Dressing Status  Clean, dry, and intact   Dressing Type  Absorptive   Non-Pressure Injury Full thickness (subcut/muscle)   Wound Length (cm) 0.2 cm   Wound Width (cm) 0.2 cm   Wound Depth (cm) (Depthnot assessed at this time)   Wound Surface area (cm^2) 0.04 cm^2   Change in Wound Size % 33.33   Condition of Base Pink;Rolled/curled   Condition of Edges Rolled/curled   Tissue Type Pink   Drainage Amount  Scant   Drainage Color Serosanguinous   Periwound Skin Condition Intact   Cleansing and Cleansing Agents  (vashe and gauze)   Dressing Type Applied (bacitracin, silvercel, mepilex border, soft roll, coban)   Procedure Tolerated Well

## 2019-03-14 ENCOUNTER — HOSPITAL ENCOUNTER (OUTPATIENT)
Dept: WOUND CARE | Age: 84
Discharge: HOME | End: 2019-03-14
Payer: MEDICARE

## 2019-03-14 VITALS
OXYGEN SATURATION: 97 % | TEMPERATURE: 97.7 F | DIASTOLIC BLOOD PRESSURE: 100 MMHG | SYSTOLIC BLOOD PRESSURE: 127 MMHG | RESPIRATION RATE: 18 BRPM | HEART RATE: 71 BPM

## 2019-03-14 PROCEDURE — 99211 OFF/OP EST MAY X REQ PHY/QHP: CPT

## 2019-03-14 NOTE — DISCHARGE INSTRUCTIONS
For Home Care/Self Care  Keep dressing dry and intact when bathing    Leave dressings in place until next visit    [facility]. Inspect your wounds, looking for signs of infection which may include the following:  Increase in redness  Red \"streaks\" from wound  Increase in swelling  Fever  Unusual odor  Change in the amount of wound drainage. Should you experience any significant changes in your wound(s) or have any questions regarding your home care instructions please contact the wound center or your home health company. If after regular business hours, please call your family doctor or local emergency room. Edema Control:   Elevate legs as much as possible. Avoid standing in one position for more than 10 minutes. Avoid setting with legs down. Do not cross legs while sitting. Off-Loading:     Frequent position changes. Do not cross legs while sitting. Shift weight every 20 minutes or more when sitting for prolonged periods of time.

## 2019-03-14 NOTE — WOUND CARE
03/14/19 1535   Wound Elbow Left   Date First Assessed/Time First Assessed: 01/15/19 1418   POA: Yes  Wound Type: Abcess; Other  Location: Elbow  Wound Description (Optional): open with drainage  Orientation: Left   Dressing Status  Clean, dry, and intact   Dressing Type  Other (Comment)  (coban, soft roll, mepilex border, silvercell )   Non-Pressure Injury Full thickness (subcut/muscle)   Wound Length (cm) 0.2 cm   Wound Width (cm) 0.2 cm   Wound Depth (cm) 0.2   Wound Surface area (cm^2) 0.04 cm^2   Change in Wound Size % 33.33   Condition of Base Pink   Condition of Edges Open   Tissue Type Pink   Tissue Type Percent Pink 100   Direction of Tunnels 12  oclock   Depth of Tunnel/Sinus (cm) 1.3 cm   Direction of Undermining 1    oclock;12    oclock   Depth of Undermining (cm) 0.5   Drainage Amount  Small    Drainage Color Serosanguinous   Wound Odor None   Periwound Skin Condition Intact   Cleansing and Cleansing Agents  Dermal wound cleanser   Dressing Type Applied Other (Comment)  (coban, soft roll, mepilex border, silvercell, evelin)   Procedure Tolerated Well

## 2019-03-18 ENCOUNTER — HOSPITAL ENCOUNTER (OUTPATIENT)
Dept: WOUND CARE | Age: 84
Discharge: HOME | End: 2019-03-18
Payer: MEDICARE

## 2019-03-18 VITALS
HEART RATE: 77 BPM | DIASTOLIC BLOOD PRESSURE: 55 MMHG | SYSTOLIC BLOOD PRESSURE: 141 MMHG | TEMPERATURE: 97.5 F | OXYGEN SATURATION: 96 % | RESPIRATION RATE: 19 BRPM

## 2019-03-18 PROCEDURE — 99211 OFF/OP EST MAY X REQ PHY/QHP: CPT

## 2019-03-18 NOTE — DISCHARGE INSTRUCTIONS
For Home Care/Self Care  Keep dressing dry and intact when bathing    Leave dressings in place until next visit    Patient to return for wound care in: [place] [address] IF UNABLE TO 24 [facility]. Inspect your wounds, looking for signs of infection which may include the following:  Increase in redness  Red \"streaks\" from wound  Increase in swelling  Fever  Unusual odor  Change in the amount of wound drainage. Should you experience any significant changes in your wound(s) or have any questions regarding your home care instructions please contact the wound center or your home health company. If after regular business hours, please call your family doctor or local emergency room. Edema Control:   Elevate legs as much as possible. Avoid standing in one position for more than 10 minutes. Avoid setting with legs down. Do not cross legs while sitting. Off-Loading:     Frequent position changes. Do not cross legs while sitting.  Shift weight every 20 minutes or more when sitting for prolonged periods of time.

## 2019-03-18 NOTE — WOUND CARE
03/18/19 1428   Wound Elbow Left   Date First Assessed/Time First Assessed: 01/15/19 1418   POA: Yes  Wound Type: Abcess; Other  Location: Elbow  Wound Description (Optional): open with drainage  Orientation: Left   Dressing Status  Clean, dry, and intact   Dressing Type  Other (Comment)  (coban, soft roll, mepilex border)   Non-Pressure Injury Full thickness (subcut/muscle)   Condition of Base Pink   Condition of Edges Open   Tissue Type Pink   Tissue Type Percent Pink 100   Direction of Tunnels 12  oclock   Depth of Tunnel/Sinus (cm) 2 cm   Direction of Undermining 1    oclock;11    oclock   Depth of Undermining (cm) (0.5)   Drainage Amount  Small    Drainage Color Serosanguinous   Wound Odor None   Periwound Skin Condition Intact   Cleansing and Cleansing Agents  Dermal wound cleanser   Dressing Type Applied Other (Comment)  (Patti,silvercel,mepitel one,mepilex border,soft roll coban)   Procedure Tolerated Well

## 2019-03-21 ENCOUNTER — HOSPITAL ENCOUNTER (OUTPATIENT)
Dept: WOUND CARE | Age: 84
Discharge: HOME | End: 2019-03-21
Payer: MEDICARE

## 2019-03-21 VITALS
RESPIRATION RATE: 19 BRPM | TEMPERATURE: 97.5 F | DIASTOLIC BLOOD PRESSURE: 56 MMHG | OXYGEN SATURATION: 100 % | SYSTOLIC BLOOD PRESSURE: 135 MMHG | HEART RATE: 68 BPM

## 2019-03-21 PROCEDURE — 99211 OFF/OP EST MAY X REQ PHY/QHP: CPT

## 2019-03-21 NOTE — WOUND CARE
03/21/19 1100 Wound Elbow Left Date First Assessed/Time First Assessed: 01/15/19 1418   POA: Yes  Wound Type: Abcess; Other  Location: Elbow  Wound Description (Optional): open with drainage  Orientation: Left Dressing Status  Clean, dry, and intact Dressing Type  Absorptive; Compression dressing Non-Pressure Injury Full thickness (subcut/muscle) Wound Length (cm) 0.2 cm Wound Width (cm) 0.2 cm Wound Depth (cm) 0.1 Wound Surface area (cm^2) 0.04 cm^2 Change in Wound Size % 33.33 Condition of Base Pink Condition of Edges Open;Rolled/curled Tissue Type Pink Direction of Tunnels 11  oclock Depth of Tunnel/Sinus (cm) 1.7 cm Drainage Amount  Small Drainage Color Serosanguinous; Tan  
Wound Odor None Periwound Skin Condition Intact; Macerated Cleansing and Cleansing Agents  Other (comment); Soap and water (vashe) Dressing Type Applied Other (Comment) (Patti packing, silvercel, Mepilex border, soft roll, coban) Procedure Tolerated Well

## 2019-03-21 NOTE — DISCHARGE INSTRUCTIONS
For Home Care/Self Care  Keep dressing dry and intact when bathing    Leave dressings in place until next visit    Patient to return for wound care in: [address] [facility] IF UNABLE TO 24 [facility]. Inspect your wounds, looking for signs of infection which may include the following:  Increase in redness  Red \"streaks\" from wound  Increase in swelling  Fever  Unusual odor  Change in the amount of wound drainage. Should you experience any significant changes in your wound(s) or have any questions regarding your home care instructions please contact the wound center or your home health company. If after regular business hours, please call your family doctor or local emergency room. Edema Control:   Elevate legs as much as possible. Avoid standing in one position for more than 10 minutes. Avoid setting with legs down. Do not cross legs while sitting. Off-Loading:     Frequent position changes. Do not cross legs while sitting. Shift weight every 20 minutes or more when sitting for prolonged periods of time.

## 2019-03-25 ENCOUNTER — HOSPITAL ENCOUNTER (OUTPATIENT)
Dept: WOUND CARE | Age: 84
Discharge: HOME | End: 2019-03-25
Payer: MEDICARE

## 2019-03-25 VITALS
DIASTOLIC BLOOD PRESSURE: 59 MMHG | OXYGEN SATURATION: 96 % | HEART RATE: 70 BPM | SYSTOLIC BLOOD PRESSURE: 147 MMHG | TEMPERATURE: 98.1 F | RESPIRATION RATE: 19 BRPM

## 2019-03-25 PROCEDURE — 99211 OFF/OP EST MAY X REQ PHY/QHP: CPT

## 2019-03-25 NOTE — DISCHARGE INSTRUCTIONS
For Home Care/Self Care   Keep dressing dry and intact when bathing     Leave dressings in place until next visit     Patient to return for wound care in: [facility] IF UNABLE TO 24 [facility]. Inspect your wounds, looking for signs of infection which may include the following:  Increase in redness  Red \"streaks\" from wound  Increase in swelling  Fever  Unusual odor  Change in the amount of wound drainage. Should you experience any significant changes in your wound(s) or have any questions regarding your home care instructions please contact the wound center or your home health company. If after regular business hours, please call your family doctor or local emergency room. Edema Control:   Elevate legs as much as possible. Avoid standing in one position for more than 10 minutes. Avoid setting with legs down. Do not cross legs while sitting. Off-Loading:     Frequent position changes. Do not cross legs while sitting. Shift weight every 20 minutes or more when sitting for prolonged periods of time.

## 2019-03-25 NOTE — WOUND CARE
03/25/19 1103 Wound Elbow Left Date First Assessed/Time First Assessed: 01/15/19 1418   POA: Yes  Wound Type: Abcess; Other  Location: Elbow  Wound Description (Optional): open with drainage  Orientation: Left Dressing Status  Clean, dry, and intact Dressing Type  Absorptive Non-Pressure Injury Full thickness (subcut/muscle) Wound Length (cm) 0.2 cm Wound Width (cm) 0.2 cm Wound Depth (cm) 0 Wound Surface area (cm^2) 0.04 cm^2 Change in Wound Size % 33.33 Condition of Edges Rolled/curled; Open Tissue Type Pink Tissue Type Percent Pink 100 Direction of Tunnels 11  oclock Depth of Tunnel/Sinus (cm) 1.7 cm Drainage Amount  Small Drainage Color Serosanguinous Wound Odor None Periwound Skin Condition Intact; Macerated Cleansing and Cleansing Agents  Dermal wound cleanser; Other (comment) Dressing Type Applied Other (Comment) (Patti, Ag, mepilex border, soft, coban) Procedure Tolerated Well

## 2019-03-28 ENCOUNTER — HOSPITAL ENCOUNTER (OUTPATIENT)
Dept: WOUND CARE | Age: 84
Discharge: HOME | End: 2019-03-28
Payer: MEDICARE

## 2019-03-28 VITALS
SYSTOLIC BLOOD PRESSURE: 146 MMHG | HEART RATE: 74 BPM | TEMPERATURE: 97.9 F | RESPIRATION RATE: 19 BRPM | OXYGEN SATURATION: 97 % | DIASTOLIC BLOOD PRESSURE: 69 MMHG

## 2019-03-28 PROCEDURE — 99211 OFF/OP EST MAY X REQ PHY/QHP: CPT

## 2019-03-28 NOTE — WOUND CARE
03/28/19 1019 Wound Elbow Left Date First Assessed/Time First Assessed: 01/15/19 1418   POA: Yes  Wound Type: Abcess; Other  Location: Elbow  Wound Description (Optional): open with drainage  Orientation: Left Dressing Status  Clean, dry, and intact Dressing Type  Absorptive; Compression dressing Non-Pressure Injury Full thickness (subcut/muscle) Wound Length (cm) 0.2 cm Wound Width (cm) 0.4 cm Wound Depth (cm) 0.1 Wound Surface area (cm^2) 0.08 cm^2 Change in Wound Size % -33.33 Condition of Edges Rolled/curled Tissue Type Pink Direction of Tunnels 11  oclock;12  oclock Depth of Tunnel/Sinus (cm) 1.7 cm Drainage Amount  Moderate Drainage Color Serosanguinous Wound Odor None Periwound Skin Condition Intact Cleansing and Cleansing Agents  Other (comment); Soap and water (vashe) Dressing Type Applied Other (Comment) (Patti packing, silvercel, mepilex border, soft roll, coban) Procedure Tolerated Well

## 2019-03-28 NOTE — DISCHARGE INSTRUCTIONS
For Home Care/Self Care  Keep dressing dry and intact when bathing    Leave dressings in place until next visit    Patient to return for wound care in: [address] [facility] IF UNABLE TO 24 [facility]. Inspect your wounds, looking for signs of infection which may include the following:  Increase in redness  Red \"streaks\" from wound  Increase in swelling  Fever  Unusual odor  Change in the amount of wound drainage. Should you experience any significant changes in your wound(s) or have any questions regarding your home care instructions please contact the wound center or your home health company. If after regular business hours, please call your family doctor or local emergency room. Edema Control:   Elevate legs as much as possible. Avoid standing in one position for more than 10 minutes. Avoid setting with legs down. Do not cross legs while sitting. Off-Loading:     Frequent position changes. Do not cross legs while sitting. Shift weight every 20 minutes or more when sitting for prolonged periods of time.

## 2019-04-02 ENCOUNTER — HOSPITAL ENCOUNTER (OUTPATIENT)
Dept: WOUND CARE | Age: 84
Discharge: HOME | End: 2019-04-02
Attending: HOSPITALIST
Payer: MEDICARE

## 2019-04-02 VITALS
OXYGEN SATURATION: 98 % | RESPIRATION RATE: 18 BRPM | TEMPERATURE: 98.3 F | SYSTOLIC BLOOD PRESSURE: 179 MMHG | HEART RATE: 70 BPM | DIASTOLIC BLOOD PRESSURE: 69 MMHG

## 2019-04-02 PROCEDURE — 99214 OFFICE O/P EST MOD 30 MIN: CPT

## 2019-04-03 NOTE — WOUND CARE
04/02/19 1509 Wound Elbow Left Date First Assessed/Time First Assessed: 01/15/19 1418   POA: Yes  Wound Type: Abcess; Other  Location: Elbow  Wound Description (Optional): open with drainage  Orientation: Left Dressing Status  Clean, dry, and intact Dressing Type   
(coban, soft roll, mepilex border, silvercell) Non-Pressure Injury Full thickness (subcut/muscle) Wound Length (cm) 0.3 cm Wound Width (cm) 0.3 cm Wound Depth (cm) 0.2 Wound Surface area (cm^2) 0.09 cm^2 Change in Wound Size % -50 Condition of Edges Rolled/curled Tissue Type Pink Direction of Tunnels 11  oclock Depth of Tunnel/Sinus (cm) 1.7 cm Drainage Amount  Small Drainage Color Serosanguinous Wound Odor None Periwound Skin Condition Intact Cleansing and Cleansing Agents (vashe and barrier wipes) Dressing Type Applied  
(evelin, mepilex border, soft roll and coban) Procedure Tolerated Well

## 2019-04-03 NOTE — WOUND CARE
[facility] Chief Complaint: 
Rudy Roy is a 80 y.o.  male who presents with left elbow open wound and drainage secondary to olecranon bursitis since December 2018. Referred by:  Rocky Romero HPI:   He started with swelling and later it was inflamed, he was seen by his PCP and diagnosed with olecranon bursitis, he had cultures done and started on augmentin. Wound caused by: likely pressure Current wound care: local wound care Offloading wound: no Appetite: good Wound associated pain: mild Diabetic: no 
Smoker: no 
 
 
Past Medical History:  
Diagnosis Date  CAD (coronary artery disease)  Hypertension  Macular degeneration Past Surgical History:  
Procedure Laterality Date [address] [facility] CABG  
 HX HEENT  2007  
 surgical lens implant No family history on file. Social History Tobacco Use  Smoking status: Never Smoker Substance Use Topics  Alcohol use: No  
   
Prior to Admission medications Medication Sig Start Date End Date Taking? Authorizing Provider  
clopidogrel (PLAVIX) 75 mg tablet Take  by mouth daily. Felipe Reyes MD  
aspirin 81 mg chewable tablet Take 81 mg by mouth daily. Felipe Reyes MD  
lisinopril (PRINIVIL, ZESTRIL) 20 mg tablet Take  by mouth daily. Felipe Reyes MD  
simvastatin (ZOCOR) 20 mg tablet Take  by mouth nightly. Felipe Reyes MD  
furosemide (LASIX) 20 mg tablet Take 20 mg by mouth two (2) days a week. Indications: sundays and wednesdays    Felipe Reyes MD  
nitroglycerin (NITROSTAT) 0.4 mg SL tablet 0.4 mg by SubLINGual route every five (5) minutes as needed for Chest Pain. Felipe Reyes MD  
ergocalciferol (VITAMIN D2) 50,000 unit capsule Take 50,000 Units by mouth daily. Felipe Reyes MD  
fluticasone (FLONASE) 50 mcg/actuation nasal spray 2 Sprays by Both Nostrils route daily.     Felipe Reyes MD  
 loratadine (CLARITIN) 10 mg tablet Take 10 mg by mouth daily. Felipe Reyes MD  
ranitidine (ZANTAC) 150 mg tablet Take 150 mg by mouth two (2) times a day. Felipe Reyes MD  
multivitamin, tx-iron-ca-min (THERA-M W/ IRON) 9 mg iron-400 mcg tab tablet Take 1 Tab by mouth daily. Felipe Reyes MD  
carboxymethylcellulose sodium (REFRESH LIQUIGEL) 1 % dlgl Apply  to eye. Felipe Reyes MD  
 
Allergies Allergen Reactions  Iodinated Contrast- Oral And Iv Dye Anaphylaxis  Iodine Anaphylaxis  Cleocin [Clindamycin Hcl] Rash  Sulfa (Sulfonamide Antibiotics) Rash Review of Systems Gen: No fever, chills, malaise, weight loss/gain. Heent: No headache, rhinorrhea, epistaxis, ear pain, hearing loss, sinus pain, neck pain/stiffness, sore throat. Heart: No chest pain, palpitations, PATRICIO, pnd, or orthopnea. Resp: No cough, hemoptysis, wheezing and shortness of breath. GI: No nausea, vomiting, diarrhea, constipation, melena or hematochezia. : No urinary obstruction, dysuria or hematuria. Derm: see above Musc/skeletal: no bone or joint complains. Vasc: No edema, cyanosis or claudication. Endo: No heat/cold intolerance, no polyuria,polydipsia or polyphagia. Neuro: No unilateral weakness, numbness, tingling. No seizures. Heme: No easy bruising or bleeding. Objective:  
 
Physical Exam:  
 
Visit Vitals /69 (BP 1 Location: Right arm, BP Patient Position: Sitting) Pulse 70 Temp 98.3 °F (36.8 °C) Resp 18 SpO2 98% General: well developed, well nourished, pleasant , NAD. Hygiene good Psych: cooperative. Pleasant. No anxiety or depression. Normal mood and affect. Neuro: alert and oriented to person/place/situation. Otherwise nonfocal. 
Derm: Skin turgor for age, dry skin HEENT: Normocephalic, atraumatic. EOMI. Conjunctiva clear. No scleral icterus. Neck: Normal range of motion. No masses. Chest: Good air entry bilaterally. Respirations nonlabored Cardio[de-identified] Normal heart sounds,no rubs, murmurs or gallops Abdomen: Soft, nontender, nondistended, normoactive bowel sounds Lower extremities: color normal; temperature normal. Calves are supple, nontender. Capillary refill <3 sec Open wound left elbow Wound Description: Wound Length: 0.3 cm Wound Width :0.3 cm Wound Depth :0.2 Etiology: pressure Ulcer bed: wound bed not visible Periwound: Edematous Exudate: Scant/small amount Serous exudate Data Review: No results found for this or any previous visit (from the past 24 hour(s)). Assessment:  
 
Patient Active Problem List  
Diagnosis Code  Olecranon bursitis, left elbow M70.22  
 
80 y.o. male with olecranon bursitis Needs : 
 
Good local wound care Keep pressure off Plan: In wound care clinic today: 
Cleanse wound with NS or soap and water or commercial wound cleanser Apply the following topically to wound bed: evelin Apply the following to prince-wound: NA Apply the following dressings: Absorptive dressing For Home Care/Self Care: keep pressure off left elbow Cleanse wound with NS or soap and water or commercial wound cleanser Keep dressing dry and intact when bathing Apply the following to wound bed: evelin Apply the following to skin around wound: NA Apply the following dressings: Absorptive dressing Patient to return for wound care in: 7  Days Follow up with Nurse visit as recommended. PLEASE CONTACT OFFICE AS SOON AS POSSIBLE IF UNABLE TO MAKE THIS APPOINTMENT. Inspect your wounds, looking for signs of infection which may include the following:  Increase in redness  Red \"streaks\" from wound  Increase in swelling  Fever  Unusual odor  Change in the amount of wound drainage. Should you experience any significant changes in your wound(s) or have any questions regarding your home care instructions please contact the wound center or your home health company.  If after regular business hours, please call your family doctor or local emergency room. Signed By: Sol Baer MD   
 April 2, 2019

## 2019-04-10 ENCOUNTER — HOSPITAL ENCOUNTER (OUTPATIENT)
Dept: WOUND CARE | Age: 84
Discharge: HOME | End: 2019-04-10
Attending: HOSPITALIST
Payer: MEDICARE

## 2019-04-10 VITALS
SYSTOLIC BLOOD PRESSURE: 140 MMHG | HEART RATE: 75 BPM | DIASTOLIC BLOOD PRESSURE: 56 MMHG | OXYGEN SATURATION: 97 % | TEMPERATURE: 98.3 F | RESPIRATION RATE: 17 BRPM

## 2019-04-10 PROCEDURE — 99215 OFFICE O/P EST HI 40 MIN: CPT

## 2019-04-10 NOTE — WOUND CARE
04/10/19 1516 Wound Elbow Left Date First Assessed/Time First Assessed: 01/15/19 1418   POA: Yes  Wound Type: Abcess; Other  Location: Elbow  Wound Description (Optional): open with drainage  Orientation: Left Dressing Status  Breakthrough drainage Dressing Type  Absorptive Non-Pressure Injury Full thickness (subcut/muscle) Wound Length (cm) 0.2 cm Wound Width (cm) 0.2 cm Wound Depth (cm) 0.1 Wound Surface area (cm^2) 0.04 cm^2 Change in Wound Size % 33.33 Condition of Edges Rolled/curled Tissue Type Pink Direction of Tunnels 11  oclock Depth of Tunnel/Sinus (cm) 1.8 cm Drainage Amount  Moderate Drainage Color Serosanguinous Wound Odor None Periwound Skin Condition Intact Cleansing and Cleansing Agents  Other (comment) (vashe) Dressing Type Applied (Steri-strips, Mepilex border, Soft roll, Tubi-) Procedure Tolerated Fair

## 2019-04-10 NOTE — WOUND CARE
[facility] Chief Complaint: 
Jose Valdes is a 80 y.o.  male who presents with left elbow open wound and drainage secondary to olecranon bursitis since December 2018. Referred by:  Jose Alejandro Cruz HPI:   He started with swelling and later it was inflamed, he was seen by his PCP and diagnosed with olecranon bursitis, he had cultures done and started on augmentin. Wound caused by: likely pressure Current wound care: local wound care Offloading wound: no Appetite: good Wound associated pain: mild Diabetic: no 
Smoker: no 
 
 
Past Medical History:  
Diagnosis Date  CAD (coronary artery disease)  Hypertension  Macular degeneration Past Surgical History:  
Procedure Laterality Date [address] [facility] CABG  
 HX HEENT  2007  
 surgical lens implant History reviewed. No pertinent family history. Social History Tobacco Use  Smoking status: Never Smoker Substance Use Topics  Alcohol use: No  
   
Prior to Admission medications Medication Sig Start Date End Date Taking? Authorizing Provider  
clopidogrel (PLAVIX) 75 mg tablet Take  by mouth daily. Felipe Reyes MD  
aspirin 81 mg chewable tablet Take 81 mg by mouth daily. Felipe Reyes MD  
lisinopril (PRINIVIL, ZESTRIL) 20 mg tablet Take  by mouth daily. Felipe Reyes MD  
simvastatin (ZOCOR) 20 mg tablet Take  by mouth nightly. Felipe Reyes MD  
furosemide (LASIX) 20 mg tablet Take 20 mg by mouth two (2) days a week. Indications: sundays and wednesdays    Felipe Reyes MD  
nitroglycerin (NITROSTAT) 0.4 mg SL tablet 0.4 mg by SubLINGual route every five (5) minutes as needed for Chest Pain. Felipe Reyes MD  
ergocalciferol (VITAMIN D2) 50,000 unit capsule Take 50,000 Units by mouth daily. Felipe Reyes MD  
fluticasone (FLONASE) 50 mcg/actuation nasal spray 2 Sprays by Both Nostrils route daily.     Felipe Reyes MD  
 loratadine (CLARITIN) 10 mg tablet Take 10 mg by mouth daily. Felipe Reyes MD  
ranitidine (ZANTAC) 150 mg tablet Take 150 mg by mouth two (2) times a day. Felipe Reyes MD  
multivitamin, tx-iron-ca-min (THERA-M W/ IRON) 9 mg iron-400 mcg tab tablet Take 1 Tab by mouth daily. Felipe Reyes MD  
carboxymethylcellulose sodium (REFRESH LIQUIGEL) 1 % dlgl Apply  to eye. Felipe Reyes MD  
 
Allergies Allergen Reactions  Iodinated Contrast- Oral And Iv Dye Anaphylaxis  Iodine Anaphylaxis  Cleocin [Clindamycin Hcl] Rash  Sulfa (Sulfonamide Antibiotics) Rash Review of Systems Gen: No fever, chills, malaise, weight loss/gain. Heent: No headache, rhinorrhea, epistaxis, ear pain, hearing loss, sinus pain, neck pain/stiffness, sore throat. Heart: No chest pain, palpitations, PATRICIO, pnd, or orthopnea. Resp: No cough, hemoptysis, wheezing and shortness of breath. GI: No nausea, vomiting, diarrhea, constipation, melena or hematochezia. : No urinary obstruction, dysuria or hematuria. Derm: see above Musc/skeletal: no bone or joint complains. Vasc: No edema, cyanosis or claudication. Endo: No heat/cold intolerance, no polyuria,polydipsia or polyphagia. Neuro: No unilateral weakness, numbness, tingling. No seizures. Heme: No easy bruising or bleeding. Objective:  
 
Physical Exam:  
 
Visit Vitals /56 (BP 1 Location: Right arm, BP Patient Position: Sitting) Pulse 75 Temp 98.3 °F (36.8 °C) Resp 17 SpO2 97% General: well developed, well nourished, pleasant , NAD. Hygiene good Psych: cooperative. Pleasant. No anxiety or depression. Normal mood and affect. Neuro: alert and oriented to person/place/situation. Otherwise nonfocal. 
Derm: Skin turgor for age, dry skin HEENT: Normocephalic, atraumatic. EOMI. Conjunctiva clear. No scleral icterus. Neck: Normal range of motion. No masses. Chest: Good air entry bilaterally. Respirations nonlabored Cardio[de-identified] Normal heart sounds,no rubs, murmurs or gallops Abdomen: Soft, nontender, nondistended, normoactive bowel sounds Lower extremities: color normal; temperature normal. Calves are supple, nontender. Capillary refill <3 sec Open wound left elbow Wound Description: Wound Length: 0.2 cm Wound Width :0.2 cm Wound Depth :0.1 Etiology: pressure Ulcer bed: wound bed not visible Periwound: Edematous Exudate: Scant/small amount Serous exudate Data Review: No results found for this or any previous visit (from the past 24 hour(s)). Assessment:  
 
Patient Active Problem List  
Diagnosis Code  Olecranon bursitis, left elbow M70.22  
 
80 y.o. male with olecranon bursitis He has small opening, the edges scraped with curette and opening closed by steri strips. Needs : 
 
Good local wound care Keep pressure off Plan: In wound care clinic today: 
Cleanse wound with NS or soap and water or commercial wound cleanser Apply the following topically to wound bed: bacitracin Apply the following to prince-wound: NA Apply the following dressings: Absorptive dressing For Home Care/Self Care: keep pressure off left elbow Cleanse wound with NS or soap and water or commercial wound cleanser Keep dressing dry and intact when bathing Apply the following to wound bed: bacitracin Apply the following to skin around wound: NA Apply the following dressings: Absorptive dressing Patient to return for wound care in: 7  Days Follow up with Nurse visit as recommended. PLEASE CONTACT OFFICE AS SOON AS POSSIBLE IF UNABLE TO MAKE THIS APPOINTMENT. Inspect your wounds, looking for signs of infection which may include the following:  Increase in redness  Red \"streaks\" from wound  Increase in swelling  Fever  Unusual odor  Change in the amount of wound drainage.  Should you experience any significant changes in your wound(s) or have any questions regarding your home care instructions please contact the wound center or your home health company. If after regular business hours, please call your family doctor or local emergency room. Signed By: Leigh Bruce MD   
 April 10, 2019

## 2019-04-18 ENCOUNTER — HOSPITAL ENCOUNTER (OUTPATIENT)
Dept: WOUND CARE | Age: 84
Discharge: HOME | End: 2019-04-18
Attending: HOSPITALIST
Payer: MEDICARE

## 2019-04-18 VITALS
RESPIRATION RATE: 18 BRPM | HEART RATE: 65 BPM | OXYGEN SATURATION: 98 % | SYSTOLIC BLOOD PRESSURE: 138 MMHG | TEMPERATURE: 98.1 F | DIASTOLIC BLOOD PRESSURE: 55 MMHG

## 2019-04-18 PROCEDURE — 99215 OFFICE O/P EST HI 40 MIN: CPT

## 2019-04-18 NOTE — WOUND CARE
04/18/19 1509 Wound Elbow Left Date First Assessed/Time First Assessed: 01/15/19 1418   POA: Yes  Wound Type: Abcess; Other  Location: Elbow  Wound Description (Optional): open with drainage  Orientation: Left Dressing Status  Clean, dry, and intact Dressing Type  Absorptive; Compression dressing Non-Pressure Injury Full thickness (subcut/muscle) Wound Length (cm) 0.2 cm Wound Width (cm) 0.2 cm Wound Depth (cm) 0.1 Wound Surface area (cm^2) 0.04 cm^2 Change in Wound Size % 33.33 Condition of Edges Open Tissue Type Pink Direction of Undermining 6    oclock;3    oclock Depth of Undermining (cm) 1.8 Drainage Amount  Moderate Drainage Color Serosanguinous; Tan  
Wound Odor None Periwound Skin Condition Intact Cleansing and Cleansing Agents  Other (comment); Soap and water (vashe) Dressing Type Applied Other (Comment) (Steri-strips, Mepilex border, Soft roll, tubi- size D) Procedure Tolerated Well

## 2019-04-18 NOTE — WOUND CARE
[facility] Chief Complaint: 
Olga Gunter is a 80 y.o.  male who presents with left elbow open wound and drainage secondary to olecranon bursitis since December 2018. Referred by:  Linda Nicole HPI:   He started with swelling and later it was inflamed, he was seen by his PCP and diagnosed with olecranon bursitis, he had cultures done and started on augmentin. Wound caused by: likely pressure Current wound care: local wound care Offloading wound: no Appetite: good Wound associated pain: mild Diabetic: no 
Smoker: no 
 
 
Past Medical History:  
Diagnosis Date  CAD (coronary artery disease)  Hypertension  Macular degeneration Past Surgical History:  
Procedure Laterality Date [address] [facility] CABG  
 HX HEENT  2007  
 surgical lens implant History reviewed. No pertinent family history. Social History Tobacco Use  Smoking status: Never Smoker Substance Use Topics  Alcohol use: No  
   
Prior to Admission medications Medication Sig Start Date End Date Taking? Authorizing Provider  
clopidogrel (PLAVIX) 75 mg tablet Take  by mouth daily. Felipe Reyes MD  
aspirin 81 mg chewable tablet Take 81 mg by mouth daily. Felipe Reyes MD  
lisinopril (PRINIVIL, ZESTRIL) 20 mg tablet Take  by mouth daily. Felipe Reyes MD  
simvastatin (ZOCOR) 20 mg tablet Take  by mouth nightly. Felipe Reyes MD  
furosemide (LASIX) 20 mg tablet Take 20 mg by mouth two (2) days a week. Indications: sundays and wednesdays    Felipe Reyes MD  
nitroglycerin (NITROSTAT) 0.4 mg SL tablet 0.4 mg by SubLINGual route every five (5) minutes as needed for Chest Pain. Felipe Reyes MD  
ergocalciferol (VITAMIN D2) 50,000 unit capsule Take 50,000 Units by mouth daily. Felipe Reyes MD  
fluticasone (FLONASE) 50 mcg/actuation nasal spray 2 Sprays by Both Nostrils route daily.     Felipe Reyes MD  
 loratadine (CLARITIN) 10 mg tablet Take 10 mg by mouth daily. Felipe Reyes MD  
ranitidine (ZANTAC) 150 mg tablet Take 150 mg by mouth two (2) times a day. Felipe Reyes MD  
multivitamin, tx-iron-ca-min (THERA-M W/ IRON) 9 mg iron-400 mcg tab tablet Take 1 Tab by mouth daily. Felipe Reyes MD  
carboxymethylcellulose sodium (REFRESH LIQUIGEL) 1 % dlgl Apply  to eye. Felipe Reyes MD  
 
Allergies Allergen Reactions  Iodinated Contrast- Oral And Iv Dye Anaphylaxis  Iodine Anaphylaxis  Cleocin [Clindamycin Hcl] Rash  Sulfa (Sulfonamide Antibiotics) Rash Review of Systems Gen: No fever, chills, malaise, weight loss/gain. Heent: No headache, rhinorrhea, epistaxis, ear pain, hearing loss, sinus pain, neck pain/stiffness, sore throat. Heart: No chest pain, palpitations, PATRICIO, pnd, or orthopnea. Resp: No cough, hemoptysis, wheezing and shortness of breath. GI: No nausea, vomiting, diarrhea, constipation, melena or hematochezia. : No urinary obstruction, dysuria or hematuria. Derm: see above Musc/skeletal: no bone or joint complains. Vasc: No edema, cyanosis or claudication. Endo: No heat/cold intolerance, no polyuria,polydipsia or polyphagia. Neuro: No unilateral weakness, numbness, tingling. No seizures. Heme: No easy bruising or bleeding. Objective:  
 
Physical Exam:  
 
Visit Vitals /55 (BP 1 Location: Right arm, BP Patient Position: At rest;Sitting) Pulse 65 Temp 98.1 °F (36.7 °C) Resp 18 SpO2 98% General: well developed, well nourished, pleasant , NAD. Hygiene good Psych: cooperative. Pleasant. No anxiety or depression. Normal mood and affect. Neuro: alert and oriented to person/place/situation. Otherwise nonfocal. 
Derm: Skin turgor for age, dry skin HEENT: Normocephalic, atraumatic. EOMI. Conjunctiva clear. No scleral icterus. Neck: Normal range of motion. No masses. Chest: Good air entry bilaterally. Respirations nonlabored Cardio[de-identified] Normal heart sounds,no rubs, murmurs or gallops Abdomen: Soft, nontender, nondistended, normoactive bowel sounds Lower extremities: color normal; temperature normal. Calves are supple, nontender. Capillary refill <3 sec Open wound left elbow Wound Description: Wound Length: 0.2 cm Wound Width :0.2 cm Wound Depth :0.1 Etiology: pressure Ulcer bed: wound bed not visible Periwound: Edematous Exudate: Scant/small amount Serous exudate Data Review: No results found for this or any previous visit (from the past 24 hour(s)). Assessment:  
 
Patient Active Problem List  
Diagnosis Code  Olecranon bursitis, left elbow M70.22  
 
80 y.o. male with olecranon bursitis He has small opening, the edges scraped with curette and opening closed by steri strips. Needs : 
 
Good local wound care Keep pressure off Plan: In wound care clinic today: 
Cleanse wound with NS or soap and water or commercial wound cleanser Apply the following topically to wound bed: bacitracin Apply the following to prince-wound: NA Apply the following dressings: Absorptive dressing For Home Care/Self Care: keep pressure off left elbow Cleanse wound with NS or soap and water or commercial wound cleanser Keep dressing dry and intact when bathing Apply the following to wound bed: bacitracin Apply the following to skin around wound: NA Apply the following dressings: Absorptive dressing Patient to return for wound care in: 7  Days Follow up with Nurse visit as recommended. PLEASE CONTACT OFFICE AS SOON AS POSSIBLE IF UNABLE TO MAKE THIS APPOINTMENT. Inspect your wounds, looking for signs of infection which may include the following:  Increase in redness  Red \"streaks\" from wound  Increase in swelling  Fever  Unusual odor  Change in the amount of wound drainage.  Should you experience any significant changes in your wound(s) or have any questions regarding your home care instructions please contact the wound center or your home health company. If after regular business hours, please call your family doctor or local emergency room. Signed By: Liat Ramsey MD   
 April 18, 2019

## 2019-04-24 ENCOUNTER — HOSPITAL ENCOUNTER (OUTPATIENT)
Dept: WOUND CARE | Age: 84
Discharge: HOME | End: 2019-04-24
Attending: HOSPITALIST
Payer: MEDICARE

## 2019-04-24 VITALS
DIASTOLIC BLOOD PRESSURE: 68 MMHG | SYSTOLIC BLOOD PRESSURE: 147 MMHG | RESPIRATION RATE: 16 BRPM | TEMPERATURE: 97.6 F | HEART RATE: 62 BPM | OXYGEN SATURATION: 100 %

## 2019-04-24 PROCEDURE — 99214 OFFICE O/P EST MOD 30 MIN: CPT

## 2019-04-24 PROCEDURE — 99213 OFFICE O/P EST LOW 20 MIN: CPT

## 2019-04-24 NOTE — WOUND CARE
[facility] Wound Care CenterInitial Consult note Chief Complaint: 
Bonnie Alcazar is a 80 y.o.  male who presents with left elbow open wound and drainage secondary to olecranon bursitis since December 2018. Referred by:  Sylvie Woody HPI:   He started with swelling and later it was inflamed, he was seen by his PCP and diagnosed with olecranon bursitis, he had cultures done and started on augmentin. Wound caused by: likely pressure Current wound care: local wound care Offloading wound: no Appetite: good Wound associated pain: mild Diabetic: no 
Smoker: no 
 
 
Past Medical History:  
Diagnosis Date  CAD (coronary artery disease)  Hypertension  Macular degeneration Past Surgical History:  
Procedure Laterality Date [address] [facility] CABG  
 HX HEENT  2007  
 surgical lens implant No family history on file. Social History Tobacco Use  Smoking status: Never Smoker Substance Use Topics  Alcohol use: No  
   
Prior to Admission medications Medication Sig Start Date End Date Taking? Authorizing Provider  
clopidogrel (PLAVIX) 75 mg tablet Take  by mouth daily. Yes Amy, MD Felipe  
aspirin 81 mg chewable tablet Take 81 mg by mouth daily. Yes Other, MD Felipe  
lisinopril (PRINIVIL, ZESTRIL) 20 mg tablet Take  by mouth daily. Yes Amy, MD Felipe  
simvastatin (ZOCOR) 20 mg tablet Take  by mouth nightly. Yes Felipe Reyes MD  
furosemide (LASIX) 20 mg tablet Take 20 mg by mouth two (2) days a week. Indications: sundays and wednesdays   Yes Amy, MD Felipe  
nitroglycerin (NITROSTAT) 0.4 mg SL tablet 0.4 mg by SubLINGual route every five (5) minutes as needed for Chest Pain. Yes Amy, MD Felipe  
ergocalciferol (VITAMIN D2) 50,000 unit capsule Take 50,000 Units by mouth daily. Yes Amy, MD Felipe  
fluticasone (FLONASE) 50 mcg/actuation nasal spray 2 Sprays by Both Nostrils route daily.    Yes Felipe Reyes MD  
 loratadine (CLARITIN) 10 mg tablet Take 10 mg by mouth daily. Yes Other, MD Felipe  
ranitidine (ZANTAC) 150 mg tablet Take 150 mg by mouth two (2) times a day. Yes Other, MD Felipe  
multivitamin, tx-iron-ca-min (THERA-M W/ IRON) 9 mg iron-400 mcg tab tablet Take 1 Tab by mouth daily. Yes Other, MD Felipe  
carboxymethylcellulose sodium (REFRESH LIQUIGEL) 1 % dlgl Apply  to eye. Yes Other, MD Felipe  
 
Allergies Allergen Reactions  Iodinated Contrast- Oral And Iv Dye Anaphylaxis  Iodine Anaphylaxis  Cleocin [Clindamycin Hcl] Rash  Sulfa (Sulfonamide Antibiotics) Rash Review of Systems Gen: No fever, chills, malaise, weight loss/gain. Heent: No headache, rhinorrhea, epistaxis, ear pain, hearing loss, sinus pain, neck pain/stiffness, sore throat. Heart: No chest pain, palpitations, PATRICIO, pnd, or orthopnea. Resp: No cough, hemoptysis, wheezing and shortness of breath. GI: No nausea, vomiting, diarrhea, constipation, melena or hematochezia. : No urinary obstruction, dysuria or hematuria. Derm: see above Musc/skeletal: no bone or joint complains. Vasc: No edema, cyanosis or claudication. Endo: No heat/cold intolerance, no polyuria,polydipsia or polyphagia. Neuro: No unilateral weakness, numbness, tingling. No seizures. Heme: No easy bruising or bleeding. Objective:  
 
Physical Exam:  
 
Visit Vitals /68 Pulse 62 Temp 97.6 °F (36.4 °C) Resp 16 SpO2 100% General: well developed, well nourished, pleasant , NAD. Hygiene good Psych: cooperative. Pleasant. No anxiety or depression. Normal mood and affect. Neuro: alert and oriented to person/place/situation. Otherwise nonfocal. 
Derm: Skin turgor for age, dry skin HEENT: Normocephalic, atraumatic. EOMI. Conjunctiva clear. No scleral icterus. Neck: Normal range of motion. No masses. Chest: Good air entry bilaterally. Respirations nonlabored Cardio[de-identified] Normal heart sounds,no rubs, murmurs or gallops Abdomen: Soft, nontender, nondistended, normoactive bowel sounds Lower extremities: color normal; temperature normal. Calves are supple, nontender. Capillary refill <3 sec Open wound left elbow Wound Description: Wound Length: (P) 0.2 cm Wound Width :(P) 0.2 cm Wound Depth :(P) 0.1 Etiology: pressure Ulcer bed: wound bed not visible Periwound: Edematous Exudate: Scant/small amount Serous exudate Data Review: No results found for this or any previous visit (from the past 24 hour(s)). Assessment:  
 
Patient Active Problem List  
Diagnosis Code  Olecranon bursitis, left elbow M70.22  
 
80 y.o. male with olecranon bursitis He has small opening, he continues to have drainage. Does not look infected. Needs : 
 
Good local wound care Keep pressure off Plan: In wound care clinic today: 
Cleanse wound with NS or soap and water or commercial wound cleanser Apply the following topically to wound bed: evelin Apply the following to prince-wound: NA Apply the following dressings: Absorptive dressing For Home Care/Self Care: keep pressure off left elbow Cleanse wound with NS or soap and water or commercial wound cleanser Keep dressing dry and intact when bathing Apply the following to wound bed: evelin Apply the following to skin around wound: NA Apply the following dressings: Absorptive dressing Patient to return for wound care in: 14  Days Follow up with Nurse visit as recommended. PLEASE CONTACT OFFICE AS SOON AS POSSIBLE IF UNABLE TO MAKE THIS APPOINTMENT. Inspect your wounds, looking for signs of infection which may include the following:  Increase in redness  Red \"streaks\" from wound  Increase in swelling  Fever  Unusual odor  Change in the amount of wound drainage.  Should you experience any significant changes in your wound(s) or have any questions regarding your home care instructions please contact the wound center or your home health company. If after regular business hours, please call your family doctor or local emergency room. Signed By: Michelle Vergara MD   
 April 24, 2019

## 2019-04-29 ENCOUNTER — HOSPITAL ENCOUNTER (OUTPATIENT)
Dept: WOUND CARE | Age: 84
Discharge: HOME | End: 2019-04-29
Attending: HOSPITALIST
Payer: MEDICARE

## 2019-04-29 VITALS
HEART RATE: 68 BPM | DIASTOLIC BLOOD PRESSURE: 61 MMHG | RESPIRATION RATE: 18 BRPM | SYSTOLIC BLOOD PRESSURE: 150 MMHG | OXYGEN SATURATION: 98 % | TEMPERATURE: 98.1 F

## 2019-04-29 PROCEDURE — 99211 OFF/OP EST MAY X REQ PHY/QHP: CPT

## 2019-04-29 NOTE — DISCHARGE INSTRUCTIONS
Patient to return for wound care in: 14  Days  Follow up with Nurse visit as recommended. PLEASE CONTACT OFFICE AS SOON AS POSSIBLE IF UNABLE TO MAKE THIS APPOINTMENT. Inspect your wounds, looking for signs of infection which may include the following:  Increase in redness  Red \"streaks\" from wound  Increase in swelling  Fever  Unusual odor  Change in the amount of wound drainage. Should you experience any significant changes in your wound(s) or have any questions regarding your home care instructions please contact the wound center or your home health company. If after regular business hours, please call your family doctor or local emergency room.

## 2019-04-29 NOTE — WOUND CARE
04/29/19 7718 Wound Elbow Left Date First Assessed/Time First Assessed: 01/15/19 1418   POA: Yes  Wound Type: Abcess; Other  Location: Elbow  Wound Description (Optional): open with drainage  Orientation: Left Dressing Status  Breakthrough drainage Dressing Type  Absorptive; Tubular dressing Non-Pressure Injury Full thickness (subcut/muscle) Wound Length (cm) 0.2 cm Wound Width (cm) 0.2 cm Wound Depth (cm) 0.2 Wound Surface area (cm^2) 0.04 cm^2 Change in Wound Size % 33.33 Condition of Edges Rolled/curled Tissue Type Other (comment) Tissue Type Percent Other (comment) 100 
(white) Direction of Undermining  
(12-12 @ 1.4 ) Drainage Amount  Moderate Drainage Color Tan Wound Odor None Periwound Skin Condition Macerated Cleansing and Cleansing Agents  Other (comment) (vashe) Dressing Type Applied Other (Comment) 
(evelin, steristrips, mepilex border, sftrll, tubi, foam ) Procedure Tolerated Well Picture from encounter did not save to [facility]

## 2019-04-29 NOTE — WOUND CARE
04/24/19 1336 Wound Elbow Left Date First Assessed/Time First Assessed: 01/15/19 1418   POA: Yes  Wound Type: Abcess; Other  Location: Elbow  Wound Description (Optional): open with drainage  Orientation: Left Dressing Status  Intact; Old drainage Dressing Type  Absorptive; Compression dressing Non-Pressure Injury Full thickness (subcut/muscle) Wound Length (cm) 0.2 cm Wound Width (cm) 0.2 cm Wound Depth (cm) 0.1 Wound Surface area (cm^2) 0.04 cm^2 Change in Wound Size % 33.33 Condition of Edges Open Tissue Type Pink Direction of Tunnels 11  oclock Depth of Tunnel/Sinus (cm) 1.6 cm Depth of Undermining (cm) 1.6 Drainage Amount  Moderate Drainage Color Serosanguinous; Tan  
Wound Odor None Periwound Skin Condition Intact Cleansing and Cleansing Agents  Dermal wound cleanser Dressing Type Applied Collagens/cell matrix; Compression dressing; Other (Comment) (steri strips, mepilex border, tubigrip,) Procedure Tolerated Well

## 2019-05-02 ENCOUNTER — HOSPITAL ENCOUNTER (OUTPATIENT)
Dept: WOUND CARE | Age: 84
Discharge: HOME | End: 2019-05-02
Attending: HOSPITALIST
Payer: MEDICARE

## 2019-05-02 VITALS
DIASTOLIC BLOOD PRESSURE: 54 MMHG | RESPIRATION RATE: 18 BRPM | TEMPERATURE: 97.3 F | HEART RATE: 73 BPM | OXYGEN SATURATION: 97 % | SYSTOLIC BLOOD PRESSURE: 117 MMHG

## 2019-05-02 PROCEDURE — 99211 OFF/OP EST MAY X REQ PHY/QHP: CPT

## 2019-05-03 NOTE — DISCHARGE INSTRUCTIONS
Keep dressing dry and intact when bathing   Apply the following to wound bed: evelin   Apply the following to skin around wound: NA   Apply the following dressings: Absorptive dressing     Patient to return for wound care in: 14  Days   Follow up with Nurse visit as recommended. PLEASE CONTACT OFFICE AS SOON AS POSSIBLE IF UNABLE TO MAKE THIS APPOINTMENT. Inspect your wounds, looking for signs of infection which may include the following:  Increase in redness  Red \"streaks\" from wound  Increase in swelling  Fever  Unusual odor  Change in the amount of wound drainage. Should you experience any significant changes in your wound(s) or have any questions regarding your home care instructions please contact the wound center or your home health company. If after regular business hours, please call your family doctor or local emergency room.     04/24/19 [id number]

## 2019-05-03 NOTE — WOUND CARE
05/02/19 1148 Wound Elbow Left Date First Assessed/Time First Assessed: 01/15/19 1418   POA: Yes  Wound Type: Abcess; Other  Location: Elbow  Wound Description (Optional): open with drainage  Orientation: Left Dressing Status  Breakthrough drainage Dressing Type  Absorptive Non-Pressure Injury Full thickness (subcut/muscle) Wound Length (cm) 0.2 cm Wound Width (cm) 0.2 cm Wound Depth (cm) 0.2 Wound Surface area (cm^2) 0.04 cm^2 Change in Wound Size % 33.33 Condition of Edges Rolled/curled Tissue Type Pink Direction of Undermining 12    oclock Drainage Amount  Small Drainage Color Clear Wound Odor None Periwound Skin Condition Macerated Cleansing and Cleansing Agents  Dermal wound cleanser; Other (comment) Dressing Type Applied Other (Comment) (Patti, steri strip, mepilex border, soft roll, tubigrip) Procedure Tolerated Well

## 2019-05-06 ENCOUNTER — HOSPITAL ENCOUNTER (OUTPATIENT)
Dept: WOUND CARE | Age: 84
Discharge: HOME | End: 2019-05-06
Attending: HOSPITALIST
Payer: MEDICARE

## 2019-05-06 VITALS
TEMPERATURE: 98.1 F | OXYGEN SATURATION: 100 % | SYSTOLIC BLOOD PRESSURE: 131 MMHG | HEART RATE: 66 BPM | DIASTOLIC BLOOD PRESSURE: 50 MMHG | RESPIRATION RATE: 18 BRPM

## 2019-05-06 PROCEDURE — 99211 OFF/OP EST MAY X REQ PHY/QHP: CPT

## 2019-05-06 NOTE — WOUND CARE
05/06/19 1050 Wound Elbow Left Date First Assessed/Time First Assessed: 01/15/19 1418   POA: Yes  Wound Type: Abcess; Other  Location: Elbow  Wound Description (Optional): open with drainage  Orientation: Left Dressing Status  Breakthrough drainage Dressing Type  Absorptive Non-Pressure Injury Full thickness (subcut/muscle) Wound Length (cm) 0.2 cm Wound Width (cm) 0.2 cm Wound Depth (cm) 0.8 Wound Surface area (cm^2) 0.04 cm^2 Change in Wound Size % 33.33 Condition of Edges Open Tissue Type Pink Direction of Undermining 12    oclock Depth of Undermining (cm) 1.9 Drainage Amount  Small Drainage Color Tan Wound Odor None Periwound Skin Condition Macerated Cleansing and Cleansing Agents (foam wash, vashe and barrier wipes) Dressing Type Applied  
(evelin, bacitracin, mepilex border, tubigrip) Procedure Tolerated Well

## 2019-05-09 ENCOUNTER — HOSPITAL ENCOUNTER (OUTPATIENT)
Dept: LAB | Age: 84
Discharge: HOME | End: 2019-05-09
Payer: MEDICARE

## 2019-05-09 ENCOUNTER — HOSPITAL ENCOUNTER (OUTPATIENT)
Dept: WOUND CARE | Age: 84
Discharge: HOME | End: 2019-05-09
Attending: HOSPITALIST
Payer: MEDICARE

## 2019-05-09 ENCOUNTER — HOSPITAL ENCOUNTER (OUTPATIENT)
Dept: GENERAL RADIOLOGY | Age: 84
Discharge: HOME | End: 2019-05-09
Payer: MEDICARE

## 2019-05-09 VITALS
HEART RATE: 63 BPM | DIASTOLIC BLOOD PRESSURE: 66 MMHG | SYSTOLIC BLOOD PRESSURE: 150 MMHG | TEMPERATURE: 97.6 F | OXYGEN SATURATION: 100 % | RESPIRATION RATE: 18 BRPM

## 2019-05-09 LAB
BASOPHILS # BLD: 0 K/UL (ref 0–0.1)
BASOPHILS NFR BLD: 0 % (ref 0–2)
CRP SERPL-MCNC: <0.3 MG/DL (ref 0–0.3)
DIFFERENTIAL METHOD BLD: ABNORMAL
EOSINOPHIL # BLD: 0.2 K/UL (ref 0–0.4)
EOSINOPHIL NFR BLD: 3 % (ref 0–5)
ERYTHROCYTE [DISTWIDTH] IN BLOOD BY AUTOMATED COUNT: 13.6 % (ref 11.6–14.5)
ERYTHROCYTE [SEDIMENTATION RATE] IN BLOOD: 30 MM/HR (ref 0–20)
HCT VFR BLD AUTO: 37.3 % (ref 36–48)
HGB BLD-MCNC: 11.8 G/DL (ref 13–16)
LYMPHOCYTES # BLD: 1.2 K/UL (ref 0.9–3.6)
LYMPHOCYTES NFR BLD: 17 % (ref 21–52)
MCH RBC QN AUTO: 30.7 PG (ref 24–34)
MCHC RBC AUTO-ENTMCNC: 31.6 G/DL (ref 31–37)
MCV RBC AUTO: 97.1 FL (ref 74–97)
MONOCYTES # BLD: 0.8 K/UL (ref 0.05–1.2)
MONOCYTES NFR BLD: 12 % (ref 3–10)
NEUTS SEG # BLD: 4.8 K/UL (ref 1.8–8)
NEUTS SEG NFR BLD: 68 % (ref 40–73)
PLATELET # BLD AUTO: 287 K/UL (ref 135–420)
PMV BLD AUTO: 9 FL (ref 9.2–11.8)
RBC # BLD AUTO: 3.84 M/UL (ref 4.7–5.5)
WBC # BLD AUTO: 7.1 K/UL (ref 4.6–13.2)

## 2019-05-09 PROCEDURE — 36415 COLL VENOUS BLD VENIPUNCTURE: CPT

## 2019-05-09 PROCEDURE — 87070 CULTURE OTHR SPECIMN AEROBIC: CPT

## 2019-05-09 PROCEDURE — 73080 X-RAY EXAM OF ELBOW: CPT

## 2019-05-09 PROCEDURE — 99213 OFFICE O/P EST LOW 20 MIN: CPT

## 2019-05-09 PROCEDURE — 87075 CULTR BACTERIA EXCEPT BLOOD: CPT

## 2019-05-09 PROCEDURE — 86140 C-REACTIVE PROTEIN: CPT

## 2019-05-09 PROCEDURE — 85025 COMPLETE CBC W/AUTO DIFF WBC: CPT

## 2019-05-09 PROCEDURE — 85652 RBC SED RATE AUTOMATED: CPT

## 2019-05-09 NOTE — PROGRESS NOTES
Patient presents to wound clinic for: Deepti Fletcher is a 80 y.o. male who presents initial consult with me for a chronically draining wound in left elbow. It was first assessed in 1/19. Different dressings and treatments have been used. Pertinent Medical History: 
Past Medical History:  
Diagnosis Date  CAD (coronary artery disease)  Hypertension  Macular degeneration Past Surgical History:  
Procedure Laterality Date [address] [facility] CABG  
 HX HEENT  2007  
 surgical lens implant Prior to Admission medications Medication Sig Start Date End Date Taking? Authorizing Provider  
clopidogrel (PLAVIX) 75 mg tablet Take  by mouth daily. Felipe Reyes MD  
aspirin 81 mg chewable tablet Take 81 mg by mouth daily. Felipe Reyes MD  
lisinopril (PRINIVIL, ZESTRIL) 20 mg tablet Take  by mouth daily. Felipe Reyes MD  
simvastatin (ZOCOR) 20 mg tablet Take  by mouth nightly. Felipe Reyes MD  
furosemide (LASIX) 20 mg tablet Take 20 mg by mouth two (2) days a week. Indications: sundays and wednesdays    Felipe Reyes MD  
nitroglycerin (NITROSTAT) 0.4 mg SL tablet 0.4 mg by SubLINGual route every five (5) minutes as needed for Chest Pain. Felipe Reyes MD  
ergocalciferol (VITAMIN D2) 50,000 unit capsule Take 50,000 Units by mouth daily. Felipe Reyes MD  
fluticasone (FLONASE) 50 mcg/actuation nasal spray 2 Sprays by Both Nostrils route daily. Felipe Reyes MD  
loratadine (CLARITIN) 10 mg tablet Take 10 mg by mouth daily. Felipe Reyes MD  
ranitidine (ZANTAC) 150 mg tablet Take 150 mg by mouth two (2) times a day. Felipe Reyes MD  
multivitamin, tx-iron-ca-min (THERA-M W/ IRON) 9 mg iron-400 mcg tab tablet Take 1 Tab by mouth daily. Felipe Reyes MD  
carboxymethylcellulose sodium (REFRESH LIQUIGEL) 1 % dlgl Apply  to eye. Felipe Reyes MD  
 
Allergies Allergen Reactions  Iodinated Contrast- Oral And Iv Dye Anaphylaxis  Iodine Anaphylaxis  Cleocin [Clindamycin Hcl] Rash  Sulfa (Sulfonamide Antibiotics) Rash No family history on file. Social History Socioeconomic History  Marital status:  Spouse name: Not on file  Number of children: Not on file  Years of education: Not on file  Highest education level: Not on file Occupational History  Not on file Social Needs  Financial resource strain: Not on file  Food insecurity:  
  Worry: Not on file Inability: Not on file  Transportation needs:  
  Medical: Not on file Non-medical: Not on file Tobacco Use  Smoking status: Never Smoker Substance and Sexual Activity  Alcohol use: No  
 Drug use: Not on file  Sexual activity: Not on file Lifestyle  Physical activity:  
  Days per week: Not on file Minutes per session: Not on file  Stress: Not on file Relationships  Social connections:  
  Talks on phone: Not on file Gets together: Not on file Attends Mosque service: Not on file Active member of club or organization: Not on file Attends meetings of clubs or organizations: Not on file Relationship status: Not on file  Intimate partner violence:  
  Fear of current or ex partner: Not on file Emotionally abused: Not on file Physically abused: Not on file Forced sexual activity: Not on file Other Topics Concern  Not on file Social History Narrative  Not on file Vitals:  
 05/09/19 1117 BP: 150/66 Pulse: 63 Resp: 18 Temp: 97.6 °F (36.4 °C) SpO2: 100% Review of Systems: 
 
Gen: No fever, chills, malaise, weight loss/gain. Heent: No headache, rhinorrhea, epistaxis, ear pain, hearing loss, sinus pain, neck pain/stiffness, sore throat. Heart: No chest pain, palpitations, PATRICIO, pnd, or orthopnea. Resp: No cough, hemoptysis, wheezing and shortness of breath. GI: No nausea, vomiting, diarrhea, constipation, melena or hematochezia. : No urinary obstruction, dysuria or hematuria. Derm: see below Musc/skeletal: no bone or joint complains. Vasc: No edema, cyanosis or claudication. Endo: No heat/cold intolerance, no polyuria,polydipsia or polyphagia. Neuro: No unilateral weakness, numbness, tingling. No seizures. Heme: No easy bruising or bleeding. Exam: Pleasant elderly male in no obvious distress. CV RRR. Lungs CTA. Neuro at baseline and grossly intact. Extremities pt has mobility issues in left shoulder. Wound Description: 
 
  
   
Wound Elbow Left Date First Assessed/Time First Assessed: 01/15/19 1418   POA: Yes  Wound Type: Abcess; Other  Location: Elbow  Wound Description (Optional): open with drainage  Orientation: Left Dressing Status  Intact; Old drainage Dressing Type  Absorptive Non-Pressure Injury Full thickness (subcut/muscle) Wound Length (cm) 0.2 cm Wound Width (cm) 0.2 cm Wound Depth (cm) 0.8 Wound Surface area (cm^2) 0.04 cm^2 Change in Wound Size % 33.33 Condition of Edges Open Tissue Type Pink Tissue Type Percent Other (comment) 100 Direction of Undermining 12    oclock Depth of Undermining (cm) 2.0 Drainage Amount  Small Drainage Color Clear Wound Odor None Periwound Skin Condition Macerated Debridement: not required today. Assessment: Chronically draining skin sinus of left elbow Plan:  
1. Wound culture 2. Left elbow xray to rule out osteomyelitis. 3. Labs - ESR, CRP, CBC Soren Rey MD 
 
 no

## 2019-05-09 NOTE — WOUND CARE
05/09/19 1121 Wound Elbow Left Date First Assessed/Time First Assessed: 01/15/19 1418   POA: Yes  Wound Type: Abcess; Other  Location: Elbow  Wound Description (Optional): open with drainage  Orientation: Left Dressing Status  Intact; Old drainage Dressing Type  Absorptive Non-Pressure Injury Full thickness (subcut/muscle) Wound Length (cm) 0.2 cm Wound Width (cm) 0.2 cm Wound Depth (cm) 0.8 Wound Surface area (cm^2) 0.04 cm^2 Change in Wound Size % 33.33 Condition of Edges Open Tissue Type Pink Tissue Type Percent Other (comment) 100 Direction of Undermining 12    oclock Depth of Undermining (cm) 2.0 Drainage Amount  Small Drainage Color Clear Wound Odor None Periwound Skin Condition Macerated Cleansing and Cleansing Agents  Dermal wound cleanser Dressing Type Applied Other (Comment) 
(evelin, mepilex border, soft roll, hypfix tape,tubigrip) Procedure Tolerated Well

## 2019-05-11 LAB
BACTERIA SPEC CULT: ABNORMAL
GRAM STN SPEC: ABNORMAL
GRAM STN SPEC: ABNORMAL
SERVICE CMNT-IMP: ABNORMAL

## 2019-05-13 ENCOUNTER — HOSPITAL ENCOUNTER (OUTPATIENT)
Dept: WOUND CARE | Age: 84
Discharge: HOME | End: 2019-05-13
Attending: HOSPITALIST
Payer: MEDICARE

## 2019-05-13 VITALS
SYSTOLIC BLOOD PRESSURE: 159 MMHG | TEMPERATURE: 97.4 F | DIASTOLIC BLOOD PRESSURE: 64 MMHG | HEART RATE: 71 BPM | RESPIRATION RATE: 19 BRPM | OXYGEN SATURATION: 95 %

## 2019-05-13 PROBLEM — S51.002A UNSPECIFIED OPEN WOUND OF LEFT ELBOW, INITIAL ENCOUNTER: Status: ACTIVE | Noted: 2019-05-13

## 2019-05-13 PROBLEM — M70.22 OLECRANON BURSITIS, LEFT ELBOW: Status: RESOLVED | Noted: 2019-01-15 | Resolved: 2019-05-13

## 2019-05-13 PROCEDURE — 11042 DBRDMT SUBQ TIS 1ST 20SQCM/<: CPT

## 2019-05-13 NOTE — PROGRESS NOTES
Patient presents to wound clinic for: Jose Valdes is a 80 y.o. male who presents follow up. His daughter is with him today. He has no new complaints. Pertinent Medical History:  Past Medical History:   Diagnosis Date    CAD (coronary artery disease)     Hypertension     Macular degeneration      Past Surgical History:   Procedure Laterality Date    CARDIAC SURG PROCEDURE UNLIST  1997    CABG    HX HEENT  2007    surgical lens implant     Prior to Admission medications    Medication Sig Start Date End Date Taking? Authorizing Provider   clopidogrel (PLAVIX) 75 mg tablet Take  by mouth daily. Felipe Reyes MD   aspirin 81 mg chewable tablet Take 81 mg by mouth daily. Felipe Reyes MD   lisinopril (PRINIVIL, ZESTRIL) 20 mg tablet Take  by mouth daily. Felipe Reyes MD   simvastatin (ZOCOR) 20 mg tablet Take  by mouth nightly. Felipe Reyes MD   furosemide (LASIX) 20 mg tablet Take 20 mg by mouth two (2) days a week. Indications: sundays and wednesdays    Felipe Reyes MD   nitroglycerin (NITROSTAT) 0.4 mg SL tablet 0.4 mg by SubLINGual route every five (5) minutes as needed for Chest Pain. Felipe Reyes MD   ergocalciferol (VITAMIN D2) 50,000 unit capsule Take 50,000 Units by mouth daily. Felipe Reyes MD   fluticasone (FLONASE) 50 mcg/actuation nasal spray 2 Sprays by Both Nostrils route daily. Felipe Reyes MD   loratadine (CLARITIN) 10 mg tablet Take 10 mg by mouth daily. Felipe Reyes MD   ranitidine (ZANTAC) 150 mg tablet Take 150 mg by mouth two (2) times a day. Felipe Reyes MD   multivitamin, tx-iron-ca-min (THERA-M W/ IRON) 9 mg iron-400 mcg tab tablet Take 1 Tab by mouth daily. Felipe Reyes MD   carboxymethylcellulose sodium (REFRESH LIQUIGEL) 1 % dlgl Apply  to eye.     Felipe Reyes MD     Allergies   Allergen Reactions    Iodinated Contrast- Oral And Iv Dye Anaphylaxis    Iodine Anaphylaxis    Cleocin [Clindamycin Hcl] Rash    Sulfa (Sulfonamide Antibiotics) Rash No family history on file. Social History     Socioeconomic History    Marital status:      Spouse name: Not on file    Number of children: Not on file    Years of education: Not on file    Highest education level: Not on file   Occupational History    Not on file   Social Needs    Financial resource strain: Not on file    Food insecurity:     Worry: Not on file     Inability: Not on file    Transportation needs:     Medical: Not on file     Non-medical: Not on file   Tobacco Use    Smoking status: Never Smoker   Substance and Sexual Activity    Alcohol use: No    Drug use: Not on file    Sexual activity: Not on file   Lifestyle    Physical activity:     Days per week: Not on file     Minutes per session: Not on file    Stress: Not on file   Relationships    Social connections:     Talks on phone: Not on file     Gets together: Not on file     Attends Spiritism service: Not on file     Active member of club or organization: Not on file     Attends meetings of clubs or organizations: Not on file     Relationship status: Not on file    Intimate partner violence:     Fear of current or ex partner: Not on file     Emotionally abused: Not on file     Physically abused: Not on file     Forced sexual activity: Not on file   Other Topics Concern    Not on file   Social History Narrative    Not on file       Vitals:    05/13/19 1103   BP: 159/64   Pulse: 71   Resp: 19   Temp: 97.4 °F (36.3 °C)   SpO2: 95%       Review of Systems:    Gen: No fever, chills, malaise, weight loss/gain. Heent: No headache, rhinorrhea, epistaxis, ear pain, hearing loss, sinus pain, neck pain/stiffness, sore throat. Heart: No chest pain, palpitations, PATRICIO, pnd, or orthopnea. Resp: No cough, hemoptysis, wheezing and shortness of breath. GI: No nausea, vomiting, diarrhea, constipation, melena or hematochezia. : No urinary obstruction, dysuria or hematuria.    Derm: see below  Musc/skeletal: no bone or joint complains. Vasc: No edema, cyanosis or claudication. Endo: No heat/cold intolerance, no polyuria,polydipsia or polyphagia. Neuro: No unilateral weakness, numbness, tingling. No seizures. Heme: No easy bruising or bleeding. Wound Description and Procedure Note:  05/13/19 1106    Wound Elbow Left   Date First Assessed/Time First Assessed: 01/15/19 1418   POA: Yes  Wound Type: Abcess; Other  Location: Elbow  Wound Description (Optional): open with drainage  Orientation: Left   Dressing Status  Intact; Old drainage   Dressing Type  Absorptive   Non-Pressure Injury Full thickness (subcut/muscle)   Wound Length (cm) 0.2 cm   Wound Width (cm) 0.2 cm   Wound Depth (cm) 0.7   Wound Surface area (cm^2) 0.04 cm^2   Change in Wound Size % 33.33   Condition of Edges Open   Tissue Type Pink   Direction of Undermining 12    oclock   Depth of Undermining (cm) 1.7   Drainage Amount  Small    Drainage Color Clear   Wound Odor None   Periwound Skin Condition Macerated   Cleansing and Cleansing Agents     (foam wash, vashe and barrier wipes)   Dressing Type Applied    (evelin, mepilex border, soft roll, tubigrip)   Wound Procedure Type Debridement- Surgical   Procedure Time Out 1140   Consent Obtained  Yes   Procedure Bleeding Minimal   Procedure Hemostasis  Pressure   Type of Tissue Removed  Devitalized   Procedure Instrument  Curette   Procedure Pain Scale Numeric 0/10   Post-Procedure Length (cm) 0.3 cm   Post-Procedure Width (cm) 0.3 cm   Post-Procedure Depth (cm) 0.8 cm   Post-Procedure Volume (cm^3) 0.07 cm^3   Post-Procedure Surface Area (cm^2) 0.09 cm^2   Post Procedure Pain Scale Numeric 0/10   Assisted Physician in Procedure  Yes   Procedure Tolerated      Debridement done to promote wound healing. Tissue removed: Exudate, dermis, epidermis, subcutaneous.     Labs and Radiology  Ordered: Elbow, CBC, CRP, wound culture   Results: xray negative for osteo, CBC and CRP are WNL, wound culture was negative. Assessment:  Patient Active Problem List   Diagnosis Code    Unspecified open wound of left elbow, initial encounter S51.002A           Plan:   1. Serial debridement x 4 weeks. 2. Patti to tunneling. 3. Discuss bone scan at next visit to rule out osteo.     Betty Lopez MD

## 2019-05-13 NOTE — WOUND CARE
05/13/19 1106 Wound Elbow Left Date First Assessed/Time First Assessed: 01/15/19 1418   POA: Yes  Wound Type: Abcess; Other  Location: Elbow  Wound Description (Optional): open with drainage  Orientation: Left Dressing Status  Intact; Old drainage Dressing Type  Absorptive Non-Pressure Injury Full thickness (subcut/muscle) Wound Length (cm) 0.2 cm Wound Width (cm) 0.2 cm Wound Depth (cm) 0.7 Wound Surface area (cm^2) 0.04 cm^2 Change in Wound Size % 33.33 Condition of Edges Open Tissue Type Pink Direction of Undermining 12    oclock Depth of Undermining (cm) 1.7 Drainage Amount  Small Drainage Color Clear Wound Odor None Periwound Skin Condition Macerated Cleansing and Cleansing Agents (foam wash, vashe and barrier wipes) Dressing Type Applied  
(evelin, mepilex border, soft roll, tubigrip) Wound Procedure Type Debridement- Surgical  
Procedure Time Out 6083 Consent Obtained  Yes Procedure Bleeding Minimal  
Procedure Hemostasis  Pressure Type of Tissue Removed  Devitalized Procedure Instrument  Curette Procedure Pain Scale Numeric 0/10 Post-Procedure Length (cm) 0.3 cm Post-Procedure Width (cm) 0.3 cm Post-Procedure Depth (cm) 0.8 cm Post-Procedure Volume (cm^3) 0.07 cm^3 Post-Procedure Surface Area (cm^2) 0.09 cm^2 Post Procedure Pain Scale Numeric 0/10 Assisted Physician in Procedure  Yes Procedure Tolerated Well Post debridement picture:

## 2019-05-14 LAB
BACTERIA SPEC CULT: NORMAL
SERVICE CMNT-IMP: NORMAL

## 2019-05-16 ENCOUNTER — HOSPITAL ENCOUNTER (OUTPATIENT)
Dept: WOUND CARE | Age: 84
Discharge: HOME | End: 2019-05-16
Attending: HOSPITALIST
Payer: MEDICARE

## 2019-05-16 PROCEDURE — 99211 OFF/OP EST MAY X REQ PHY/QHP: CPT

## 2019-05-16 NOTE — WOUND CARE
05/16/19 1542 Wound Elbow Left Date First Assessed/Time First Assessed: 01/15/19 1418   POA: Yes  Wound Type: Abcess; Other  Location: Elbow  Wound Description (Optional): open with drainage  Orientation: Left Dressing Status  Clean, dry, and intact Dressing Type  Absorptive; Tubular dressing Non-Pressure Injury Full thickness (subcut/muscle) Wound Length (cm) 0.2 cm Wound Width (cm) 0.2 cm Wound Depth (cm) 0.2 Wound Surface area (cm^2) 0.04 cm^2 Change in Wound Size % 33.33 Condition of Edges Open;Rolled/curled Tissue Type Pink Drainage Amount  Moderate Drainage Color Serosanguinous; Tan  
Wound Odor None Periwound Skin Condition Intact Cleansing and Cleansing Agents  Other (comment); Soap and water (vashe) Dressing Type Applied Other (Comment) (Promogram, Mepilex border, Soft roll, Tubi- size C) Procedure Tolerated Well

## 2019-05-20 ENCOUNTER — HOSPITAL ENCOUNTER (OUTPATIENT)
Dept: WOUND CARE | Age: 84
Discharge: HOME | End: 2019-05-20
Attending: HOSPITALIST
Payer: MEDICARE

## 2019-05-20 VITALS
TEMPERATURE: 97.4 F | HEART RATE: 68 BPM | SYSTOLIC BLOOD PRESSURE: 125 MMHG | RESPIRATION RATE: 17 BRPM | OXYGEN SATURATION: 100 % | DIASTOLIC BLOOD PRESSURE: 50 MMHG

## 2019-05-20 PROCEDURE — 97597 DBRDMT OPN WND 1ST 20 CM/<: CPT

## 2019-05-20 NOTE — PROGRESS NOTES
Patient presents to wound clinic for: Alec Machado is a 80 y.o. male whom presents for follow up on his draining wound on left elbow. No new complaints today. Review of Systems: 
 
Gen: No fever, chills, malaise, weight loss/gain. GI: No nausea, vomiting, diarrhea, constipation, melena or hematochezia. Derm: see below Musc/skeletal: no bone or joint complains. Vasc: No edema, cyanosis or claudication. Exam:  
Visit Vitals /50 (BP 1 Location: Right arm, BP Patient Position: At rest;Sitting) Pulse 68 Temp 97.4 °F (36.3 °C) Resp 17 SpO2 100% Gen: Pleasant elderly male in NAD. He is cheerful as usual.  
Neuro: baseline, A+Ox4. Motor grossly intact. Wound Description: 
 
 05/20/19 1113 Wound Elbow Left Date First Assessed/Time First Assessed: 01/15/19 1418   POA: Yes  Wound Type: Abcess; Other  Location: Elbow  Wound Description (Optional): open with drainage  Orientation: Left Dressing Status  Clean, dry, and intact Dressing Type  Absorptive; Tubular dressing Non-Pressure Injury Full thickness (subcut/muscle) Wound Length (cm) 0.2 cm Wound Width (cm) 0.4 cm Wound Depth (cm) 0.2 Wound Surface area (cm^2) 0.08 cm^2 Change in Wound Size % -33.33 Condition of Edges Rolled/curled Tissue Type Pink Direction of Undermining 11 o'clock;3 o'clock Depth of Undermining (cm) 1.7 Drainage Amount  Moderate Drainage Color Serosanguinous; Tan  
Wound Odor None Periwound Skin Condition Intact Cleansing and Cleansing Agents  Other (comment); Soap and water (vashe) Debridement: required today to promote wound healing Ulcer Debridement Ulcer Number 1; Left  Abscess elbow Breakdown of Skin Character of Ulcer: Unchanged Indication for Debridement: Slough, Exudate Risks of procedure were discussed with patient. Consent has been signed. Anesthetic: None Level of Debridement: Subctaneous Tissue Tissue and/or Material removed: LenidZofia Type of Tissue: Non- Viable Pre-debridement severity: Limited to skin Breakdown Post debridement severity: Limited to skin to Breakdown Instrument(s) used: Curette Bleeding controlled with: Pressure Estimated blood loss: Minimal 
 
Patient tolerated procedure well. Assessment: 
 
Patient Active Problem List  
Diagnosis Code  Unspecified open wound of left elbow, initial encounter S51.002A Plan: 1. Will continue the serial debridements for two more weeks. If no improvement will get a bone scan to rule out a tiny area of osteo on the elbow.    
 
Anabella Guajadro MD

## 2019-05-21 NOTE — WOUND CARE
05/20/19 1113 Wound Elbow Left Date First Assessed/Time First Assessed: 01/15/19 1418   POA: Yes  Wound Type: Abcess; Other  Location: Elbow  Wound Description (Optional): open with drainage  Orientation: Left Dressing Status  Clean, dry, and intact Dressing Type  Absorptive; Tubular dressing Non-Pressure Injury Full thickness (subcut/muscle) Wound Length (cm) 0.2 cm Wound Width (cm) 0.4 cm Wound Depth (cm) 0.2 Wound Surface area (cm^2) 0.08 cm^2 Change in Wound Size % -33.33 Condition of Edges Rolled/curled Tissue Type Pink Direction of Undermining 11 o'clock;3 o'clock Depth of Undermining (cm) 1.7 Drainage Amount  Moderate Drainage Color Serosanguinous; Tan  
Wound Odor None Periwound Skin Condition Intact Cleansing and Cleansing Agents  Other (comment); Soap and water (vashe) Dressing Type Applied Other (Comment) (Promogram, bacitracin, Mepilex border, soft roll, Tubi-) Wound Procedure Type Debridement- Surgical  
Consent Obtained  Yes Procedure Bleeding Minimal  
Procedure Hemostasis  Pressure Type of Tissue Removed  Devitalized Procedure Instrument  Curette Procedure Pain Scale Numeric 0/10 Debridement Procedure Performed by  
(Dr. Chauhan Marker) Post-Procedure Length (cm) 0.2 cm Post-Procedure Width (cm) 0.4 cm Post-Procedure Depth (cm) 0.2 cm Post-Procedure Volume (cm^3) 0.02 cm^3 Post-Procedure Surface Area (cm^2) 0.08 cm^2 Assisted Physician in Procedure  Yes Procedure Tolerated Well Post-debridement photo:

## 2019-05-23 ENCOUNTER — HOSPITAL ENCOUNTER (OUTPATIENT)
Dept: WOUND CARE | Age: 84
Discharge: HOME | End: 2019-05-23
Attending: HOSPITALIST
Payer: MEDICARE

## 2019-05-23 VITALS
DIASTOLIC BLOOD PRESSURE: 43 MMHG | RESPIRATION RATE: 19 BRPM | HEART RATE: 68 BPM | TEMPERATURE: 97.9 F | OXYGEN SATURATION: 97 % | SYSTOLIC BLOOD PRESSURE: 123 MMHG

## 2019-05-23 PROCEDURE — 99211 OFF/OP EST MAY X REQ PHY/QHP: CPT

## 2019-05-23 NOTE — WOUND CARE
05/23/19 1024 Wound Elbow Left Date First Assessed/Time First Assessed: 01/15/19 1418   POA: Yes  Wound Type: Abcess; Other  Location: Elbow  Wound Description (Optional): open with drainage  Orientation: Left Dressing Status  Clean, dry, and intact Dressing Type  Absorptive 
(tubigrip) Non-Pressure Injury Full thickness (subcut/muscle) Wound Length (cm) 0.2 cm Wound Width (cm) 0.3 cm Wound Depth (cm) 0.3 Wound Surface area (cm^2) 0.06 cm^2 Change in Wound Size % 0 Condition of Edges Rolled/curled Tissue Type Pink Direction of Undermining 12 o'clock Depth of Undermining (cm) 1.5 Drainage Amount  Scant Drainage Color Serous Wound Odor None Periwound Skin Condition Intact Cleansing and Cleansing Agents (foam wash, vashe and barrier wipes) Dressing Type Applied  
(evelin, mepilex border,soft roll, tubigrip E) Procedure Tolerated Well

## 2019-05-30 ENCOUNTER — HOSPITAL ENCOUNTER (OUTPATIENT)
Dept: WOUND CARE | Age: 84
Discharge: HOME | End: 2019-05-30
Attending: HOSPITALIST
Payer: MEDICARE

## 2019-05-30 VITALS
HEART RATE: 72 BPM | TEMPERATURE: 97.8 F | OXYGEN SATURATION: 96 % | DIASTOLIC BLOOD PRESSURE: 58 MMHG | SYSTOLIC BLOOD PRESSURE: 142 MMHG | RESPIRATION RATE: 17 BRPM

## 2019-05-30 PROCEDURE — 97597 DBRDMT OPN WND 1ST 20 CM/<: CPT

## 2019-05-30 NOTE — DISCHARGE INSTRUCTIONS
For Home Care/Self Care: keep pressure off left elbow     Keep dressing dry and intact when bathing        Patient to return for wound care in: Follow up in 1 week for Physician assessment and dressing change    PLEASE CONTACT OFFICE AS SOON AS POSSIBLE IF UNABLE TO MAKE THIS APPOINTMENT. Inspect your wounds, looking for signs of infection which may include the following:  Increase in redness  Red \"streaks\" from wound  Increase in swelling  Fever  Unusual odor  Change in the amount of wound drainage. Should you experience any significant changes in your wound(s) or have any questions regarding your home care instructions please contact the wound center or your home health company. If after regular business hours, please call your family doctor or local emergency room.

## 2019-05-30 NOTE — WOUND CARE
05/30/19 1114   Wound Elbow Left   Date First Assessed/Time First Assessed: 01/15/19 1418   POA: Yes  Wound Type: Abcess; Other  Location: Elbow  Wound Description (Optional): open with drainage  Orientation: Left   Wound Length (cm) 0.2 cm   Wound Width (cm) 0.2 cm   Wound Depth (cm) 0.5   Wound Surface area (cm^2) 0.04 cm^2   Change in Wound Size % 33.33   Condition of Edges Rolled/curled   Tissue Type Pink   Direction of Tunnel 12 o'clock   Depth of Tunnel/Sinus (cm) 1.2 cm   Direction of Undermining   (circumfrential)   Depth of Undermining (cm) 0.5   Drainage Amount  Moderate   Drainage Color Tan;Serous   Wound Odor None   Periwound Skin Condition Macerated   Cleansing and Cleansing Agents  Other (comment)  (vashe)   Dressing Type Applied Other (Comment)  (tubigrip, soft roll, mepilex, promogran )   Procedure Tolerated Well

## 2019-05-30 NOTE — PROGRESS NOTES
Patient presents to wound clinic for: Americo Schwarz is a 80 y.o. male whom presents follow up of his open wound on his left elbow. He denies any new complaints. He has been compliant. Review of Systems:    Gen: No fever, chills, malaise, weight loss/gain. GI: No nausea, vomiting, diarrhea, constipation, melena or hematochezia. Derm: see below  Musc/skeletal: no bone or joint complains. Vasc: No edema, cyanosis or claudication. Exam:   Visit Vitals  /58 (BP 1 Location: Right arm, BP Patient Position: Sitting)   Pulse 72   Temp 97.8 °F (36.6 °C)   Resp 17   SpO2 96%     Gen: Pleasant elderly male in his usual state of health. Neuro: A+OX4. Motor grossly intact. Wound Description and Procedure Note:  05/30/19 1114    Wound Elbow Left   Date First Assessed/Time First Assessed: 01/15/19 1418   POA: Yes  Wound Type: Abcess; Other  Location: Elbow  Wound Description (Optional): open with drainage  Orientation: Left   Wound Length (cm) 0.2 cm   Wound Width (cm) 0.2 cm   Wound Depth (cm) 0.5   Wound Surface area (cm^2) 0.04 cm^2   Change in Wound Size % 33.33   Condition of Edges Rolled/curled   Tissue Type Pink   Direction of Tunnel 12 o'clock   Depth of Tunnel/Sinus (cm) 1.2 cm   Direction of Undermining    (circumfrential)   Depth of Undermining (cm) 0.5   Drainage Amount  Moderate   Drainage Color Tan;Serous   Wound Odor None   Periwound Skin Condition Macerated   Cleansing and Cleansing Agents  Other (comment)  (vashe)   Dressing Type Applied Other (Comment)  (tubigrip, soft roll, mepilex, promogran )   Procedure Tolerated Well          Debridement: required today to promote wound healing   Ulcer Debridement    Ulcer Number 1; Left  Non Pressure  elbow To Fat Layer    Character of Ulcer: Unchanged     Indication for Debridement: Slough, Exudate    Pre debridement measurements:    Wound Length: 0.2 cm      Wound Width :0.2 cm      Wound Depth :0.5    Risks of procedure were discussed with patient. Consent has been signed. Anesthetic: None         Tissue and/or Material removed: Fibrin/ Slough, dermis, epidermis, subcutaneous. Type of Tissue: Non- Viable        Instrument(s) used: Curette    Bleeding controlled with: Pressure    Estimated blood loss: Minimal    Post debridement measurements:Measurements:    Wound Length: 0.2 cm      Wound Width :0.2 cm      Wound Depth :0.5    Post procedural pain: none    Patient tolerated procedure well. Assessment:    Patient Active Problem List   Diagnosis Code    Unspecified open wound of left elbow, initial encounter S51.002A         Pt has had no overall improvement in his care. At this point we need to consider osteo as an underlying cause of nonclosure of this wound. Recent wound cultures were negative. Plain film was negative. Plan: 1. Order bone scan of left elbow to rule out Osteo. 2. RTC in 1-2 weeks to discuss results and ongoing plan.       Isa Aaron MD

## 2019-06-04 ENCOUNTER — HOSPITAL ENCOUNTER (OUTPATIENT)
Dept: NUCLEAR MEDICINE | Age: 84
Discharge: HOME | End: 2019-06-04
Attending: FAMILY MEDICINE
Payer: MEDICARE

## 2019-06-04 PROCEDURE — 78315 BONE IMAGING 3 PHASE: CPT

## 2019-06-07 ENCOUNTER — HOSPITAL ENCOUNTER (OUTPATIENT)
Dept: WOUND CARE | Age: 84
Discharge: HOME | End: 2019-06-07
Attending: FAMILY MEDICINE
Payer: MEDICARE

## 2019-06-07 VITALS
RESPIRATION RATE: 18 BRPM | HEART RATE: 75 BPM | DIASTOLIC BLOOD PRESSURE: 58 MMHG | OXYGEN SATURATION: 99 % | SYSTOLIC BLOOD PRESSURE: 126 MMHG | TEMPERATURE: 98 F

## 2019-06-07 PROCEDURE — 99214 OFFICE O/P EST MOD 30 MIN: CPT

## 2019-06-07 NOTE — PROGRESS NOTES
Patient presents to wound clinic for: Harjit Kat is a 80 y.o. male whom presents for follow up of the wound on his elbow that continues to be open. He has no new complaints. Review of Systems:    Gen: No fever, chills, malaise, weight loss/gain. GI: No nausea, vomiting, diarrhea, constipation, melena or hematochezia. Derm: see below  Musc/skeletal: no bone or joint complains. Vasc: No edema, cyanosis or claudication. Exam:   Visit Vitals  /58 (BP 1 Location: Right arm, BP Patient Position: Sitting)   Pulse 75   Temp 98 °F (36.7 °C)   Resp 18   SpO2 99%     Gen: Elderly male in NAD. Neuro: A+Ox4, motor grossly intact. Wound Description:     06/07/19 1102    Wound Elbow Left   Date First Assessed/Time First Assessed: 01/15/19 1418   POA: Yes  Wound Type: Abcess; Other  Location: Elbow  Wound Description (Optional): open with drainage  Orientation: Left   Dressing Status  Intact; Old drainage   Dressing Type  Absorptive   Non-Pressure Injury Full thickness (subcut/muscle)   Wound Length (cm) 0.2 cm   Wound Width (cm) 0.2 cm   Wound Depth (cm) 0.5   Wound Surface area (cm^2) 0.04 cm^2   Change in Wound Size % 33.33   Condition of Edges Rolled/curled   Tissue Type Pink   Tissue Type Percent Other (comment)    (maceration)   Direction of Tunnel 12 o'clock   Depth of Tunnel/Sinus (cm) 1.6 cm   Depth of Undermining (cm) 0.5   Drainage Amount  Scant   Drainage Color Serous; Tan   Wound Odor None   Periwound Skin Condition Macerated       Debridement: not required     Infection: No  Type: none  Signs and Symptoms: drainage  Recent Would Culture results: Negative. Radiological studies results: Bone scan negative. Assessment:    Patient Active Problem List   Diagnosis Code    Unspecified open wound of left elbow, initial encounter S51.002A       There has been no improvement of this wound despite debridement and evelin application.   There is no evidence of infection by cultures, xray, and bone scan. On reassessing the wound, there appears to be a cystic structure underlying the wound. I will refer him to ortho for consideration of surgical removal of the cyst sac. Plan: 1. Refer to Ortho - Dr. Pinky Hameed - Case discussed with Dr. Tianna May and he will have his office contact the patient to see him in consult. 2. RTC in 1 week.       Arthur Webb MD

## 2019-06-13 ENCOUNTER — HOSPITAL ENCOUNTER (OUTPATIENT)
Dept: WOUND CARE | Age: 84
Discharge: HOME | End: 2019-06-13
Attending: FAMILY MEDICINE
Payer: MEDICARE

## 2019-06-13 VITALS
SYSTOLIC BLOOD PRESSURE: 129 MMHG | OXYGEN SATURATION: 97 % | DIASTOLIC BLOOD PRESSURE: 55 MMHG | TEMPERATURE: 97.5 F | RESPIRATION RATE: 15 BRPM | HEART RATE: 72 BPM

## 2019-06-13 PROCEDURE — 99214 OFFICE O/P EST MOD 30 MIN: CPT

## 2019-06-13 NOTE — WOUND CARE
06/13/19 1616 Wound Elbow Left Date First Assessed/Time First Assessed: 01/15/19 1418   POA: Yes  Wound Type: Abcess; Other  Location: Elbow  Wound Description (Optional): open with drainage  Orientation: Left Dressing Status  Breakthrough drainage Dressing Type  Absorptive Non-Pressure Injury Full thickness (subcut/muscle) Wound Length (cm) 0.2 cm Wound Width (cm) 0.2 cm Wound Depth (cm) 0.5 Wound Surface area (cm^2) 0.04 cm^2 Change in Wound Size % 33.33 Condition of Edges Rolled/curled Tissue Type Pink Tissue Type Percent Other (comment) (macerated thick tissue) Direction of Tunnel 12 o'clock Depth of Tunnel/Sinus (cm) 1.6 cm Direction of Undermining  
(12 to 12) Depth of Undermining (cm) 0.5 Drainage Amount  Small Drainage Color Serous; Tan  
Wound Odor None Periwound Skin Condition Macerated Cleansing and Cleansing Agents (foam wash, vashe and barrier wipes) Dressing Type Applied  
(evelin, mepitel one, mepilex border) Procedure Tolerated Well Appointment scheduled with Dr. Boris Templeton for Monday June 17th at 56. Mr. Jill Serrano made aware of appointment and given directions to office. Office asked if he would bring CD of xray of elbow done on 5/9/19. Pt to go by xray department after leaving clinic.

## 2019-06-13 NOTE — PROGRESS NOTES
Patient presents to wound clinic for: Americo Schwarz is a 80 y.o. male whom presents for follow up of his wound. He has not heard from Dr. Maria Del Rosario Maldonado office. Review of Systems:    Gen: No fever, chills, malaise, weight loss/gain. GI: No nausea, vomiting, diarrhea, constipation, melena or hematochezia. Derm: see below  Musc/skeletal: no bone or joint complains. Vasc: No edema, cyanosis or claudication. Exam:    Gen: pleasant elderly male in NAD. Neuro: Gross motor intact, A+OX4. Wound Description:      06/13/19 1616   Wound Elbow Left   Date First Assessed/Time First Assessed: 01/15/19 1418   POA: Yes  Wound Type: Abcess; Other  Location: Elbow  Wound Description (Optional): open with drainage  Orientation: Left   Dressing Status  Breakthrough drainage   Dressing Type  Absorptive   Non-Pressure Injury Full thickness (subcut/muscle)   Wound Length (cm) 0.2 cm   Wound Width (cm) 0.2 cm   Wound Depth (cm) 0.5   Wound Surface area (cm^2) 0.04 cm^2   Change in Wound Size % 33.33   Condition of Edges Rolled/curled   Tissue Type Pink   Tissue Type Percent Other (comment)   (macerated thick tissue)   Direction of Tunnel 12 o'clock   Depth of Tunnel/Sinus (cm) 1.6 cm   Direction of Undermining   (12 to 12)   Depth of Undermining (cm) 0.5   Drainage Amount  Small    Drainage Color Serous; Tan   Wound Odor None   Periwound Skin Condition Macerated     Debridement: not required       Infection: No    Assessment:    Patient Active Problem List   Diagnosis Code    Unspecified open wound of left elbow, initial encounter S51.002A     There is no change in the patient's wound. We have called Dr. Maria Del Rosario Maldonado office and arranged an appointment for the patient. Will await that consult before making another appointment here. Plan: 1. Refer to Dr. Maria Del Rosario Maldonado. 2. Dressing change.   Cleansing and Cleansing Agents    (foam wash, vashe and barrier wipes)   Dressing Type Applied   (evelin, mepitel one, mepilex border) Procedure Tolerated Well       Gui Gaviria MD

## 2019-08-05 ENCOUNTER — HOSPITAL ENCOUNTER (OUTPATIENT)
Dept: WOUND CARE | Age: 84
Discharge: HOME | End: 2019-08-05
Attending: FAMILY MEDICINE
Payer: MEDICARE

## 2019-08-05 VITALS
OXYGEN SATURATION: 100 % | RESPIRATION RATE: 18 BRPM | DIASTOLIC BLOOD PRESSURE: 52 MMHG | TEMPERATURE: 97.4 F | SYSTOLIC BLOOD PRESSURE: 146 MMHG | HEART RATE: 62 BPM

## 2019-08-05 PROBLEM — S51.002A UNSPECIFIED OPEN WOUND OF LEFT ELBOW, INITIAL ENCOUNTER: Status: RESOLVED | Noted: 2019-05-13 | Resolved: 2019-08-05

## 2019-08-05 PROBLEM — T81.31XA POSTOPERATIVE WOUND DEHISCENCE: Status: ACTIVE | Noted: 2019-08-05

## 2019-08-05 PROCEDURE — 99214 OFFICE O/P EST MOD 30 MIN: CPT

## 2019-08-05 NOTE — PROGRESS NOTES
Patient presents to wound clinic for: Janel Neil is a 80 y.o. male whom presents with the development of a post-operative wound dehiscence. 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 Dr. Laurel Cintron took the patient to the OR on 7/3/19 to remove the bursae and repair the fistula. A few weeks ago the surgical wound broke down and the patient was referred her by the Ortho PA. Review of Systems:    Gen: No fever, chills, malaise, weight loss/gain. GI: No nausea, vomiting, diarrhea, constipation, melena or hematochezia. Derm: see below  Musc/skeletal: no bone or joint complains. Vasc: No edema, cyanosis or claudication. Exam:   Visit Vitals  /52 (BP 1 Location: Left arm)   Pulse 62   Temp 97.4 °F (36.3 °C)   Resp 18   SpO2 100%     Gen: pleasant elderly male in NAD. Neuro: A+OX4, Motor grossly intact. Wound Description:     08/05/19 1021    Wound Elbow Left dehisced surgical wound  08/05/19   Date First Assessed/Time First Assessed: 08/05/19 1020   Present on Hospital Admission: Yes  Wound Approximate Age at First Assessment (Weeks): 4 weeks  Primary Wound Type: Open incision/surgical site  Location: Elbow  Wound Location Orientation: Left. .. Dressing Status Intact; Old drainage   Dressing Type Adhesive wound dressing (Band-Aid)   Incision Site Well Approximated No   Shape round    Wound Length (cm) 0.5 cm   Wound Width (cm) 0.8 cm   Wound Depth (cm) 0.3 cm   Wound Volume (cm^3) 0.12 cm^3   Condition of Base Slough;Granulation   Condition of Edges Rolled/curled   Tissue Type Percent Pink 75   Tissue Type Percent Yellow 25   Tunneling (cm) 2.1 cm   Direction of Tunnel 12 o'clock   Drainage Amount Small  (suture removed by MD)   Drainage Color Serosanguinous   Wound Odor None   Elena-wound Assessment Blanchable erythema   Margins Epibole (rolled edges)       Debridement: not required       Infection: No    Assessment:    Patient Active Problem List   Diagnosis Code    Postoperative wound dehiscence T81.31XA       The patient has developed a wound dehiscence over his left elbow. There is no evidence of infection. A small piece of vicryl suture was noted and removed with pick ups. Case discussed with his ortho surgeon. Plan: 1. Dressing changes   Cleansing and Cleansing Agents  Dermal wound cleanser   Dressing Changed Changed/New   Dressing Type Applied Foam  (evelin, mepitel one, ca alginate, mepillex border lite 2x5)   Procedure Tolerated Well     2. RTC in 1 week.      Tammie Perera MD

## 2019-08-05 NOTE — WOUND CARE
08/05/19 1021 Wound Elbow Left dehisced surgical wound  08/05/19 Date First Assessed/Time First Assessed: 08/05/19 1020   Present on Hospital Admission: Yes  Wound Approximate Age at First Assessment (Weeks): 4 weeks  Primary Wound Type: Open incision/surgical site  Location: Elbow  Wound Location Orientation: Left. .. Dressing Status Intact; Old drainage Dressing Type Adhesive wound dressing (Band-Aid) Incision Site Well Approximated No  
Shape round Wound Length (cm) 0.5 cm Wound Width (cm) 0.8 cm Wound Depth (cm) 0.3 cm Wound Volume (cm^3) 0.12 cm^3 Condition of Base Slough;Granulation Condition of Edges Rolled/curled Tissue Type Percent Pink 75 Tissue Type Percent Yellow 25 Tunneling (cm) 2.1 cm Direction of Tunnel 12 o'clock Drainage Amount Small 
(suture removed by MD) Drainage Color Serosanguinous Wound Odor None Elena-wound Assessment Blanchable erythema Margins Epibole (rolled edges) Cleansing and Cleansing Agents  Dermal wound cleanser Dressing Changed Changed/New Dressing Type Applied Foam 
(evelin, mepitel one, ca alginate, mepillex border lite 2x5) Procedure Tolerated Well

## 2019-08-05 NOTE — DISCHARGE INSTRUCTIONS
Discharge Instructions for  [address]Arlene Haney  [address] [place], [place], [facility]  [phone] Fax [phone]    NAME:  Rome Chen OF BIRTH:  5/1/1926  MEDICAL RECORD NUMBER:  [id number]  DATE:  8/5/2019    Wound Cleansing:   Do not scrub or use excessive force. Cleanse wound prior to applying a clean dressing with:  [] Normal Saline [x] Keep Wound Dry in Shower    [] Wound Cleanser   [] Cleanse wound with Mild Soap & Water  [] May Shower at Discharge   [] Other:      Topical Treatments:  Do not apply lotions, creams, or ointments to wound bed unless directed. [] Apply moisturizing lotion to skin surrounding the wound prior to dressing change.  [] Apply antifungal ointment to skin surrounding the wound prior to dressing change.  [] Apply thin film of moisture barrier ointment to skin immediately around wound. [x] Other:  Mikki Talbot dsg in place      Dressings:           Wound Location left elbow   [] Apply Primary Dressing:       [] MediHoney Gel [] Alginate with Silver [] Alginate   [] Collagen [] Collagen with Silver   [] Santyl with Moisten saline gauze     [] Hydrocolloid   [] MediHoney Alginate [] Foam with Silver   [] Foam   [] Hydrofera Blue    [] Mepilex Border    [] Moisten with Saline [] Hydrogel [] Mepitel     [] Bactroban/Mupirocin [] Polysporin  [] Other:    [] Pack wound loosely with  [] Iodoform   [] Plain Packing  [] Other   [] Cover and Secure with:     [] Gauze [] Irais [] Kerlix   [] Ace Wrap [] Cover Roll Tape [] ABD     [] Other:    Avoid contact of tape with skin.   [] Change dressing: [] Daily    [] Every Other Day [] Three times per week   [] Once a week [x] Do Not Change Dressing   [] Other:    Dietary:  [x] Diet as tolerated: [] Calorie Diabetic Diet: [] No Added Salt:  [x] Increase Protein: [] Other:   Activity:  [x] Activity as tolerated:  [] Patient has no activity restrictions     [] Strict Bedrest: [] Remain off Work:     [] May return to full duty work:                                   [] Return to work with restrictions:             Return Appointment:  [] Wound and dressing supply provider:   [] ECF or Home Healthcare:  [] Wound Assessment: [] Physician or NP scheduled for Wound Assessment:   [x] Return Appointment: With MD  in  [facility])  [] Ordered tests:      Electronically signed April Andrea Zhou RN on 8/5/2019 at 10:27 AM     Elizabeth Lancaster 281: Should you experience any significant changes in your wound(s) or have questions about your wound care, please contact the [facility] at [address] [place] [address] 8:00 am - 4:30. If you need help with your wound outside these hours and cannot wait until we are again available, contact your PCP or go to the hospital emergency room. PLEASE NOTE: IF YOU ARE UNABLE TO OBTAIN WOUND SUPPLIES, CONTINUE TO USE THE SUPPLIES YOU HAVE AVAILABLE UNTIL YOU ARE ABLE TO REACH US. IT IS MOST IMPORTANT TO KEEP THE WOUND COVERED AT ALL TIMES.

## 2019-08-12 ENCOUNTER — HOSPITAL ENCOUNTER (OUTPATIENT)
Dept: WOUND CARE | Age: 84
Discharge: HOME | End: 2019-08-12
Attending: FAMILY MEDICINE
Payer: MEDICARE

## 2019-08-12 VITALS
OXYGEN SATURATION: 100 % | HEART RATE: 66 BPM | DIASTOLIC BLOOD PRESSURE: 53 MMHG | TEMPERATURE: 97.7 F | SYSTOLIC BLOOD PRESSURE: 134 MMHG

## 2019-08-12 PROCEDURE — 99214 OFFICE O/P EST MOD 30 MIN: CPT

## 2019-08-12 NOTE — DISCHARGE INSTRUCTIONS
Discharge Instructions for  Alireza Haney  [address] [place], [facility]  [phone] Fax [phone]    NAME:  Maxine King OF BIRTH:  5/1/1926  MEDICAL RECORD NUMBER:  [id number]  DATE:  8/12/2019    Wound Cleansing:   Dressings:           Wound Location ***   [x] Apply Primary Dressing:        Avoid contact of tape with skin. [] Change dressing: [] Daily    [] Every Other Day [] Three times per week   [] Once a week [] Do Not Change Dressing   [] Other:    Dressings:           Wound Location ***    [x] Apply Primary Dressing:       [] MediHoney Gel [] Alginate with Silver [] Alginate   [x] Collagen [] Collagen with Silver   [] Santyl with Moisten saline gauze     [] Hydrocolloid   [] MediHoney Alginate [] Foam with Silver   [] Foam   [x] Hydrofera Blue    [x] Mepilex Border    [] Moisten with Saline [] Hydrogel [x] Mepitel     [] Bactroban/Mupirocin [] Polysporin  [] Other:    []  Avoid contact of tape with skin. [x] Change dressing: [] Daily    [] Every Other Day [] Three times per week   [x] Once a week [] Do Not Change Dressing   [] Other:       Edema Control:  Apply: [] Compression Stocking []Right Leg []Left Leg   [x] Tubigrip []Right Leg Double Layer []Left Leg Double Layer       []Right Leg Single Layer []Left Leg Single Layer   [] SpandaGrip []Right Leg  []Left Leg      []Low compression 5-10 mm/Hg      []Medium compression 10-20 mm/Hg     []High compression  20-30 mm/Hg   every morning immediately when getting up should be applied to affected leg(s) from mid foot to knee making sure to cover the heel. Remove every night before going to bed. [] Elevate leg(s) above the level of the heart when sitting. [] Avoid prolonged standing in one place.    [x] Elevate arm/hand above the level of the heart []RightArm [x]LeftArm     Off-Loading:   [x] Off-loading when [] walking  [] in bed [] sitting  [] Total non-weight bearing  [] Right Leg [] Left Leg   [x] Assistive Device [] Walker [] Cane  [] Wheelchair  [] Crutches   [] Surgical shoe    [] Podus Boot(s)   [] Foam Boot(s)  [] Roll About    [] Cast Boot [] CROW Boot  [] Other:    Dietary:  [x] Diet as tolerated: [] Calorie Diabetic Diet: [] No Added Salt:  [] Increase Protein: [] Other:   Activity:  [x] Activity as tolerated:  [] Patient has no activity restrictions     [] Strict Bedrest: [] Remain off Work:     [] May return to full duty work:                                   [] Return to work with restrictions:             Return Appointment:  [x] Wound and dressing supply provider:   [] ECF or Home Healthcare:  [x] Wound Assessment: [x] Physician or NP scheduled for Wound Assessment:   [x] Return Appointment: With 7  Days *** Week(s)  [] Ordered tests:      Electronically signed Eun Fernandez LPN on 9/98/9409 at 72:98 AM     Elizabeth Lancaster 281: Should you experience any significant changes in your wound(s) or have questions about your wound care, please contact the [facility] at [address] [place] [address] 8:00 am - 4:30. If you need help with your wound outside these hours and cannot wait until we are again available, contact your PCP or go to the hospital emergency room. PLEASE NOTE: IF YOU ARE UNABLE TO OBTAIN WOUND SUPPLIES, CONTINUE TO USE THE SUPPLIES YOU HAVE AVAILABLE UNTIL YOU ARE ABLE TO REACH US. IT IS MOST IMPORTANT TO KEEP THE WOUND COVERED AT ALL TIMES.      Physician Signature:_______________________    Date: ___________ Time:  ____________

## 2019-08-12 NOTE — WOUND CARE
08/12/19 1025 Wound Elbow Left dehisced surgical wound  08/05/19 Date First Assessed/Time First Assessed: 08/05/19 1020   Present on Hospital Admission: Yes  Wound Approximate Age at First Assessment (Weeks): 4 weeks  Primary Wound Type: Open incision/surgical site  Location: Elbow  Wound Location Orientation: Left. .. Dressing Status Intact Dressing Type Adhesive wound dressing (Band-Aid) Incision Site Well Approximated No  
Shape round Wound Length (cm) 0.5 cm Wound Width (cm) 0.8 cm Wound Depth (cm) 0.3 cm Wound Volume (cm^3) 0.12 cm^3 Condition of Base Granulation;Slough Condition of Edges Rolled/curled Tissue Type Percent Pink 75 Tissue Type Percent Yellow 25 Tunneling (cm) 2 cm Direction of Tunnel 12 o'clock Drainage Amount Moderate Drainage Color Serosanguinous Wound Odor None Elena-wound Assessment Blanchable erythema Margins Epibole (rolled edges) Cleansing and Cleansing Agents  Dermal wound cleanser Dressing Changed Changed/New Dressing Type Applied Other (Comment) 
(Endoform, hydrofera blue, mepitel matilda, border,tubi E) Procedure Tolerated Well

## 2019-08-15 NOTE — PROGRESS NOTES
Patient presents to wound clinic for: Luz Pena is a 80 y.o. male whom presents with the development of a post-operative wound dehiscence. Pt went to his ortho follow up but again did not see Dr. June Pappas. He has no new complaints. Review of Systems:    Gen: No fever, chills, malaise, weight loss/gain. GI: No nausea, vomiting, diarrhea, constipation, melena or hematochezia. Derm: see below  Musc/skeletal: no bone or joint complains. Vasc: No edema, cyanosis or claudication. Exam:   Visit Vitals  /53 (BP 1 Location: Left arm, BP Patient Position: At rest)   Pulse 66   Temp 97.7 °F (36.5 °C)   SpO2 100%       Gen: pleasant elderly male in NAD. Neuro: A+OX4, Motor grossly intact. Wound Description:  08/12/19 1025    Wound Elbow Left dehisced surgical wound  08/05/19   Date First Assessed/Time First Assessed: 08/05/19 1020   Present on Hospital Admission: Yes  Wound Approximate Age at First Assessment (Weeks): 4 weeks  Primary Wound Type: Open incision/surgical site  Location: Elbow  Wound Location Orientation: Left. .. Dressing Status Intact   Dressing Type Adhesive wound dressing (Band-Aid)   Incision Site Well Approximated No   Shape round   Wound Length (cm) 0.5 cm   Wound Width (cm) 0.8 cm   Wound Depth (cm) 0.3 cm   Wound Volume (cm^3) 0.12 cm^3   Condition of Base Granulation;Slough   Condition of Edges Rolled/curled   Tissue Type Percent Pink 75   Tissue Type Percent Yellow 25   Tunneling (cm) 2 cm   Direction of Tunnel 12 o'clock   Drainage Amount Moderate   Drainage Color Serosanguinous   Wound Odor None   Elena-wound Assessment Blanchable erythema   Margins Epibole (rolled edges)     Debridement: not required     Infection: No    Assessment:    Patient Active Problem List   Diagnosis Code    Postoperative wound dehiscence T81.31XA       The patient has  a wound dehiscence over his left elbow. There is no evidence of infection. There has been no improvement this week.   Will make some changes in his dressings. Will stop the evelin and add the following. Plan: 1. Dressing changes   Cleansing and Cleansing Agents  Dermal wound cleanser   Dressing Changed Changed/New   Dressing Type Applied Other (Comment)  (Endoform, hydrofera blue, mepitel matilda, border,tubi E)   Procedure Tolerated Well        2. RTC in 1 week.      Victoria Munoz MD

## 2019-08-19 ENCOUNTER — HOSPITAL ENCOUNTER (OUTPATIENT)
Dept: WOUND CARE | Age: 84
Discharge: HOME | End: 2019-08-19
Attending: FAMILY MEDICINE
Payer: MEDICARE

## 2019-08-19 VITALS
HEART RATE: 66 BPM | OXYGEN SATURATION: 100 % | RESPIRATION RATE: 18 BRPM | TEMPERATURE: 98.1 F | SYSTOLIC BLOOD PRESSURE: 121 MMHG | DIASTOLIC BLOOD PRESSURE: 41 MMHG

## 2019-08-19 PROCEDURE — 99211 OFF/OP EST MAY X REQ PHY/QHP: CPT

## 2019-08-19 NOTE — DISCHARGE INSTRUCTIONS
Discharge Instructions for  [address]  [address], [facility]0 [facility]  [phone] Fax [phone]    NAME:  Majo Pugh OF BIRTH:  5/1/1926  MEDICAL RECORD NUMBER:  [id number]  DATE:  8/19/2019    Wound Cleansing:   Do not scrub or use excessive force. Cleanse wound prior to applying a clean dressing with:  [] Normal Saline [x] Keep Wound Dry in Shower    [] Wound Cleanser   [] Cleanse wound with Mild Soap & Water  [] May Shower at Discharge   [x] Other:  Change dressing PRN  Topical Treatments:  Do not apply lotions, creams, or ointments to wound bed unless directed. [] Apply moisturizing lotion to skin surrounding the wound prior to dressing change.  [] Apply antifungal ointment to skin surrounding the wound prior to dressing change.  [] Apply thin film of moisture barrier ointment to skin immediately around wound. [] Other:  Patient has appointment with surgical MD today, only applied protective and abd dressings.      Dressings:           Wound Location ***   [] Apply Primary Dressing:       [] MediHoney Gel [x] Alginate with Silver [] Alginate   [] Collagen [] Collagen with Silver   [] Santyl with Moisten saline gauze     [] Hydrocolloid   [] MediHoney Alginate [] Foam with Silver   [] Foam   [] Hydrofera Blue    [x] Mepilex Border    [] Moisten with Saline [] Hydrogel [] Mepitel     [] Bactroban/Mupirocin [] Polysporin  [] Other:    [] Dressings:           Wound Location ***    [] Apply Primary Dressing:       [] MediHoney Gel [x] Alginate with Silver [] Alginate   [] Collagen [] Collagen with Silver   [] Santyl with Moisten saline gauze     [] Hydrocolloid   [] MediHoney Alginate [] Foam with Silver   [] Foam   [] Hydrofera Blue    [x] Mepilex Border    [] Moisten with Saline [] Hydrogel [] Mepitel     [] Bactroban/Mupirocin [] Polysporin  [] Other: Tubigrip (E)   [] Pack wound loosely with  [] Iodoform   [] Plain Packing  [] Other [] Cover and Secure with:     [] Gauze [] Irais [] Kerlix   [] Ace Wrap [] Cover Roll Tape [] ABD     [] Other: tubigrip[(E)   Avoid contact of tape with skin. [] Change dressing: [] Daily    [] Every Other Day [] Three times per week   [] Once a week [] Do Not Change Dressing   [x] Other:PRN     Negative Pressure:           Wound Location:   [] Pressure@           mm/Hg  []Continuous []Intermittent   [] Black  [] White Foam [] Other:   []Change dressing: []Three times per week    []Other:     Pressure Relief:  []      Edema Control:  Apply: [] Compression Stocking []Right Leg []Left Leg   [] Tubigrip []Right Leg Double Layer []Left Leg Double Layer       []Right Leg Single Layer []Left Leg Single Layer   [] SpandaGrip []Right Leg  []Left Leg      []Low compression 5-10 mm/Hg      []Medium compression 10-20 mm/Hg     []High compression  20-30 mm/Hg   every morning immediately when getting up should be applied to affected leg(s) from mid foot to knee making sure to cover the heel. Remove every night before going to bed. [] Elevate leg(s) above the level of the heart when sitting. [] Avoid prolonged standing in one place.    [] Elevate arm/hand above the level of the heart []RightArm []LeftArm     Compression:  Apply: []Off-Loading:   [] Contact Cast:  Apply: [] Dietary:  [x] Diet as tolerated: [] Calorie Diabetic Diet: [] No Added Salt:  [] Increase Protein: [] Other:   Activity:  [x] Activity as tolerated:  [] Patient has no activity restrictions     [] Strict Bedrest: [] Remain off Work:     [] May return to full duty work:                                   [] Return to work with restrictions:             Return Appointment:  []  Electronically signed Wilma Encarnacion LPN on 2/11/9574 at 1:66 AM     Elizabeth Lancaster 281: Should you experience any significant changes in your wound(s) or have questions about your wound care, please contact the [address] at [facility] - FRIDAY 8:00 am - 4:30. If you need help with your wound outside these hours and cannot wait until we are again available, contact your PCP or go to the hospital emergency room. PLEASE NOTE: IF YOU ARE UNABLE TO OBTAIN WOUND SUPPLIES, CONTINUE TO USE THE SUPPLIES YOU HAVE AVAILABLE UNTIL YOU ARE ABLE TO REACH US. IT IS MOST IMPORTANT TO KEEP THE WOUND COVERED AT ALL TIMES.      Physician Signature:_______________________    Date: ___________ Time:  ____________

## 2020-03-17 ENCOUNTER — HOSPITAL ENCOUNTER (EMERGENCY)
Age: 85
Discharge: HOME | End: 2020-03-17
Attending: EMERGENCY MEDICINE
Payer: MEDICARE

## 2020-03-17 ENCOUNTER — APPOINTMENT (OUTPATIENT)
Dept: CT IMAGING | Age: 85
End: 2020-03-17
Attending: EMERGENCY MEDICINE
Payer: MEDICARE

## 2020-03-17 VITALS
WEIGHT: 180 LBS | TEMPERATURE: 95.8 F | OXYGEN SATURATION: 99 % | BODY MASS INDEX: 28.93 KG/M2 | HEART RATE: 72 BPM | SYSTOLIC BLOOD PRESSURE: 172 MMHG | HEIGHT: 66 IN | RESPIRATION RATE: 16 BRPM | DIASTOLIC BLOOD PRESSURE: 58 MMHG

## 2020-03-17 DIAGNOSIS — S51.811A SKIN TEAR OF FOREARM WITHOUT COMPLICATION, RIGHT, INITIAL ENCOUNTER: ICD-10-CM
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
## 2020-03-17 PROCEDURE — 90471 IMMUNIZATION ADMIN: CPT

## 2020-03-17 PROCEDURE — 74011250636 HC RX REV CODE- 250/636: Performed by: EMERGENCY MEDICINE

## 2020-03-17 PROCEDURE — 70450 CT HEAD/BRAIN W/O DYE: CPT

## 2020-03-17 PROCEDURE — 99285 EMERGENCY DEPT VISIT HI MDM: CPT

## 2020-03-17 PROCEDURE — 74011000250 HC RX REV CODE- 250: Performed by: EMERGENCY MEDICINE

## 2020-03-17 PROCEDURE — 90715 TDAP VACCINE 7 YRS/> IM: CPT | Performed by: EMERGENCY MEDICINE

## 2020-03-17 RX ORDER — BACITRACIN 500 [USP'U]/G
OINTMENT TOPICAL 3 TIMES DAILY
Qty: 1 TUBE | Refills: 0 | Status: SHIPPED | OUTPATIENT
Start: 2020-03-17 | End: 2020-03-27

## 2020-03-17 RX ORDER — BACITRACIN 500 [USP'U]/G
OINTMENT TOPICAL
Status: COMPLETED | OUTPATIENT
Start: 2020-03-17 | End: 2020-03-17

## 2020-03-17 RX ADMIN — TETANUS TOXOID, REDUCED DIPHTHERIA TOXOID AND ACELLULAR PERTUSSIS VACCINE, ADSORBED 0.5 ML: 5; 2.5; 8; 8; 2.5 SUSPENSION INTRAMUSCULAR at 09:15

## 2020-03-17 RX ADMIN — BACITRACIN: 500 OINTMENT TOPICAL at 08:25

## 2020-03-17 NOTE — ED TRIAGE NOTES
Arrives to ED from [facility] with c/o GLF after leg gave out. Did not hit head. No LOC. Skin tear to right arm. Left ankle pain.

## 2020-03-17 NOTE — ED PROVIDER NOTES
EMERGENCY DEPARTMENT HISTORY AND PHYSICAL EXAM    Date: 3/17/2020  Patient Name: Anthony Medeiros    History of Presenting Illness     Chief Complaint   Patient presents with   [address] [facility] [address]         History Provided By: Patient    Raúl Okeefe is a 80 y.o. male with PMHX of CAD on aspirin, hypertension who presents to the emergency department C/O fall at home. Patient reports that as he was getting out of bed this morning his left leg gave out under him and he fell to the floor. Patient denies head injury. Denies pain. He reports abrasions to right arm. No weakness. PCP: Genesis Tucker MD    Current Outpatient Medications   Medication Sig Dispense Refill    bacitracin (BACITRACIN) 500 unit/gram oint Apply  to affected area three (3) times daily for 10 days. Apply to affected area 1 Tube 0    clopidogrel (PLAVIX) 75 mg tablet Take  by mouth daily.  aspirin 81 mg chewable tablet Take 81 mg by mouth daily.  lisinopril (PRINIVIL, ZESTRIL) 20 mg tablet Take  by mouth daily.  simvastatin (ZOCOR) 20 mg tablet Take  by mouth nightly.  furosemide (LASIX) 20 mg tablet Take 20 mg by mouth two (2) days a week. Indications: sundays and wednesdays      nitroglycerin (NITROSTAT) 0.4 mg SL tablet 0.4 mg by SubLINGual route every five (5) minutes as needed for Chest Pain.  ergocalciferol (VITAMIN D2) 50,000 unit capsule Take 50,000 Units by mouth daily.  fluticasone (FLONASE) 50 mcg/actuation nasal spray 2 Sprays by Both Nostrils route daily.  loratadine (CLARITIN) 10 mg tablet Take 10 mg by mouth daily.  ranitidine (ZANTAC) 150 mg tablet Take 150 mg by mouth two (2) times a day.  multivitamin, tx-iron-ca-min (THERA-M W/ IRON) 9 mg iron-400 mcg tab tablet Take 1 Tab by mouth daily.  carboxymethylcellulose sodium (REFRESH LIQUIGEL) 1 % dlgl Apply  to eye.          Past History     Past Medical History:  Past Medical History:   Diagnosis Date    CAD (coronary artery disease)     Hypertension     Macular degeneration        Past Surgical History:  Past Surgical History:   Procedure Laterality Date    CARDIAC SURG PROCEDURE UNLIST  1997    CABG    HX HEENT  2007    surgical lens implant       Family History:  History reviewed. No pertinent family history. Social History:  Social History     Tobacco Use    Smoking status: Never Smoker    Smokeless tobacco: Never Used   Substance Use Topics    Alcohol use: No    Drug use: Not on file       Allergies: Allergies   Allergen Reactions    Iodinated Contrast Media Anaphylaxis    Iodine Anaphylaxis    Cleocin [Clindamycin Hcl] Rash    Sulfa (Sulfonamide Antibiotics) Rash         Review of Systems   Review of Systems   Constitutional: Negative for chills and fever. Respiratory: Negative for chest tightness and shortness of breath. Skin: Positive for wound. Neurological: Negative for syncope, weakness and headaches. All other systems reviewed and are negative. Physical Exam     Vitals:    03/17/20 0807 03/17/20 0844 03/17/20 0845 03/17/20 0916   BP: 172/78  177/58 163/62   Pulse: 72      Resp: 16      Temp: 95.8 °F (35.4 °C)      SpO2: 100% 97%  100%   Weight: 81.6 kg (180 lb)      Height: 5' 6\" (1.676 m)        Physical Exam  Vitals signs and nursing note reviewed. Constitutional:       Appearance: Normal appearance. Comments: Pleasant elderly male   HENT:      Head: Normocephalic and atraumatic. Nose: Nose normal. No congestion. Mouth/Throat:      Mouth: Mucous membranes are moist.   Eyes:      Extraocular Movements: Extraocular movements intact. Cardiovascular:      Rate and Rhythm: Normal rate. Pulses: Normal pulses. Heart sounds: Normal heart sounds. Pulmonary:      Effort: Pulmonary effort is normal.      Breath sounds: Normal breath sounds. Abdominal:      Palpations: Abdomen is soft. Tenderness: There is no abdominal tenderness.    Musculoskeletal: Normal range of motion. General: No tenderness, deformity or signs of injury. Right forearm: He exhibits no tenderness, no bony tenderness and no deformity. Arms:       Comments: Superficial skin tears   Skin:     General: Skin is warm and dry. Findings: Abrasion present. Comments: Scratch wound   Neurological:      General: No focal deficit present. Mental Status: He is alert and oriented to person, place, and time. GCS: GCS eye subscore is 4. GCS verbal subscore is 5. GCS motor subscore is 6. Cranial Nerves: Cranial nerves are intact. Sensory: Sensation is intact. Motor: Motor function is intact. No weakness. Diagnostic Study Results     Labs -   No results found for this or any previous visit (from the past 12 hour(s)). Radiologic Studies -   CT HEAD WO CONT   Final Result   IMPRESSION:      1. No acute intracranial abnormalities. CT Results  (Last 48 hours)               03/17/20 0835  CT HEAD WO CONT Final result    Impression:  IMPRESSION:       1. No acute intracranial abnormalities. Narrative:  EXAM: CT head       CLINICAL INDICATION/HISTORY: Fall, head pain       COMPARISON: None. TECHNIQUE: Axial CT imaging of the head was performed without intravenous   contrast. One or more dose reduction techniques were used on this CT: automated   exposure control, adjustment of the mAs and/or kVp according to patient size,   and iterative reconstruction techniques. The specific techniques used on this   CT exam have been documented in the patient's electronic medical record. Digital   Imaging and Communications in Medicine (DICOM) format image data are available   to nonaffiliated external healthcare facilities or entities on a secured, media   free, reciprocally searchable basis with patient authorization for at least a   12-month period after this study.            _______________       FINDINGS:       BRAIN AND EXTRA-AXIAL SPACES: There is no intracranial hemorrhage, mass effect,   or midline shift. There are no areas of abnormal parenchymal attenuation. There   are no abnormal extra-axial fluid collections. CALVARIUM: Intact. SINUSES: Clear. OTHER: None.       _______________               CXR Results  (Last 48 hours)    None          Medications given in the ED-  Medications   diph,Pertuss(AC),Tet Vac-PF (BOOSTRIX) suspension 0.5 mL (0.5 mL IntraMUSCular Given 3/17/20 3575)   bacitracin 500 unit/gram ointment ( Topical Given 3/17/20 3246)         Medical Decision Making   I am the first provider for this patient. I reviewed the vital signs, available nursing notes, past medical history, past surgical history, family history and social history. Vital Signs-Reviewed the patient's vital signs. Pulse Oximetry Analysis - 100% on RA     Records Reviewed: Nursing Notes    Provider Notes (Medical Decision Making): Phil Perez is a 80 y.o. male presents after mechanical fall at home. He has a walker but did not have a chance to use it prior to falling this morning as he was transitioning out of bed. Neurologic exam within normal limits, good upper lower body strength. Superficial skin abrasions/tears to right upper extremity and minor scratch to left lower extremity that do not require primary repair. Will update tetanus and get images of head given risk factors. Procedures:  Procedures    ED Course:       Diagnosis and Disposition     Critical Care:     DISCHARGE NOTE:    Villarreal Scale  results have been reviewed with him. He has been counseled regarding his diagnosis, treatment, and plan. He verbally conveys understanding and agreement of the signs, symptoms, diagnosis, treatment and prognosis and additionally agrees to follow up as discussed. He also agrees with the care-plan and conveys that all of his questions have been answered.   I have also provided discharge instructions for him that include: educational information regarding their diagnosis and treatment, and list of reasons why they would want to return to the ED prior to their follow-up appointment, should his condition change. He has been provided with education for proper emergency department utilization. CLINICAL IMPRESSION:    1. Fall, initial encounter    2. Skin tear of forearm without complication, right, initial encounter        PLAN:  1. D/C Home  2. Current Discharge Medication List      START taking these medications    Details   bacitracin (BACITRACIN) 500 unit/gram oint Apply  to affected area three (3) times daily for 10 days. Apply to affected area  Qty: 1 Tube, Refills: 0           3. Follow-up Information     Follow up With Specialties Details Why Contact Info    Delmy Boone MD Family Practice  [facility]  [phone]          _______________________________      Please note that this dictation was completed with [employer], the computer voice recognition software. Quite often unanticipated grammatical, syntax, homophones, and other interpretive errors are inadvertently transcribed by the computer software. Please disregard these errors. Please excuse any errors that have escaped final proofreading.

## 2020-03-17 NOTE — ED NOTES
Patient's daughter to give patient ride back to facility. Daughter called 3 x by ED staff with no answer. Pt calling daughter on personal phone for ETA.

## 2020-03-17 NOTE — ED NOTES
Wound care performed on skin tears to right arm and wrist. Cleansed, ABX ointment applied. Telfa and kerlix dressing applied.